# Patient Record
Sex: FEMALE | Race: WHITE | NOT HISPANIC OR LATINO | Employment: UNEMPLOYED | URBAN - METROPOLITAN AREA
[De-identification: names, ages, dates, MRNs, and addresses within clinical notes are randomized per-mention and may not be internally consistent; named-entity substitution may affect disease eponyms.]

---

## 2021-04-29 ENCOUNTER — OFFICE VISIT (OUTPATIENT)
Dept: FAMILY MEDICINE CLINIC | Facility: CLINIC | Age: 41
End: 2021-04-29

## 2021-04-29 VITALS
SYSTOLIC BLOOD PRESSURE: 144 MMHG | HEART RATE: 100 BPM | WEIGHT: 272 LBS | RESPIRATION RATE: 18 BRPM | BODY MASS INDEX: 43.71 KG/M2 | HEIGHT: 66 IN | TEMPERATURE: 97.1 F | OXYGEN SATURATION: 98 % | DIASTOLIC BLOOD PRESSURE: 82 MMHG

## 2021-04-29 DIAGNOSIS — R22.1 NECK MASS: ICD-10-CM

## 2021-04-29 DIAGNOSIS — E78.5 DYSLIPIDEMIA: ICD-10-CM

## 2021-04-29 DIAGNOSIS — J82.83 EOSINOPHILIC ASTHMA: Primary | ICD-10-CM

## 2021-04-29 DIAGNOSIS — Z12.31 ENCOUNTER FOR SCREENING MAMMOGRAM FOR MALIGNANT NEOPLASM OF BREAST: ICD-10-CM

## 2021-04-29 DIAGNOSIS — E07.9 THYROID DISORDER: ICD-10-CM

## 2021-04-29 DIAGNOSIS — E55.9 VITAMIN D DEFICIENCY: ICD-10-CM

## 2021-04-29 DIAGNOSIS — F17.200 SMOKER: ICD-10-CM

## 2021-04-29 DIAGNOSIS — Z01.419 ROUTINE GYNECOLOGICAL EXAMINATION: ICD-10-CM

## 2021-04-29 DIAGNOSIS — Z00.00 ANNUAL PHYSICAL EXAM: ICD-10-CM

## 2021-04-29 DIAGNOSIS — Z11.59 NEED FOR HEPATITIS C SCREENING TEST: ICD-10-CM

## 2021-04-29 LAB
BASOPHILS # BLD AUTO: 0.07 10*3/MM3 (ref 0–0.2)
BASOPHILS NFR BLD AUTO: 0.7 % (ref 0–1.5)
DEPRECATED RDW RBC AUTO: 40.3 FL (ref 37–54)
EOSINOPHIL # BLD AUTO: 0.54 10*3/MM3 (ref 0–0.4)
EOSINOPHIL NFR BLD AUTO: 5.6 % (ref 0.3–6.2)
ERYTHROCYTE [DISTWIDTH] IN BLOOD BY AUTOMATED COUNT: 12.8 % (ref 12.3–15.4)
HCT VFR BLD AUTO: 44.7 % (ref 34–46.6)
HGB BLD-MCNC: 14.5 G/DL (ref 12–15.9)
IMM GRANULOCYTES # BLD AUTO: 0.1 10*3/MM3 (ref 0–0.05)
IMM GRANULOCYTES NFR BLD AUTO: 1 % (ref 0–0.5)
LYMPHOCYTES # BLD AUTO: 1.99 10*3/MM3 (ref 0.7–3.1)
LYMPHOCYTES NFR BLD AUTO: 20.6 % (ref 19.6–45.3)
MCH RBC QN AUTO: 28.4 PG (ref 26.6–33)
MCHC RBC AUTO-ENTMCNC: 32.4 G/DL (ref 31.5–35.7)
MCV RBC AUTO: 87.6 FL (ref 79–97)
MONOCYTES # BLD AUTO: 0.74 10*3/MM3 (ref 0.1–0.9)
MONOCYTES NFR BLD AUTO: 7.7 % (ref 5–12)
NEUTROPHILS NFR BLD AUTO: 6.2 10*3/MM3 (ref 1.7–7)
NEUTROPHILS NFR BLD AUTO: 64.4 % (ref 42.7–76)
NRBC BLD AUTO-RTO: 0 /100 WBC (ref 0–0.2)
PLATELET # BLD AUTO: 301 10*3/MM3 (ref 140–450)
PMV BLD AUTO: 11.1 FL (ref 6–12)
RBC # BLD AUTO: 5.1 10*6/MM3 (ref 3.77–5.28)
WBC # BLD AUTO: 9.64 10*3/MM3 (ref 3.4–10.8)

## 2021-04-29 PROCEDURE — 99204 OFFICE O/P NEW MOD 45 MIN: CPT | Performed by: FAMILY MEDICINE

## 2021-04-29 PROCEDURE — 84443 ASSAY THYROID STIM HORMONE: CPT | Performed by: FAMILY MEDICINE

## 2021-04-29 PROCEDURE — 86803 HEPATITIS C AB TEST: CPT | Performed by: FAMILY MEDICINE

## 2021-04-29 PROCEDURE — 82306 VITAMIN D 25 HYDROXY: CPT | Performed by: FAMILY MEDICINE

## 2021-04-29 PROCEDURE — 84439 ASSAY OF FREE THYROXINE: CPT | Performed by: FAMILY MEDICINE

## 2021-04-29 PROCEDURE — 85025 COMPLETE CBC W/AUTO DIFF WBC: CPT | Performed by: FAMILY MEDICINE

## 2021-04-29 PROCEDURE — 80053 COMPREHEN METABOLIC PANEL: CPT | Performed by: FAMILY MEDICINE

## 2021-04-29 RX ORDER — ALBUTEROL SULFATE 90 UG/1
2 AEROSOL, METERED RESPIRATORY (INHALATION) EVERY 6 HOURS PRN
Qty: 18 G | Refills: 5 | Status: SHIPPED | OUTPATIENT
Start: 2021-04-29 | End: 2021-06-11 | Stop reason: SDUPTHER

## 2021-04-29 RX ORDER — ALBUTEROL SULFATE 90 UG/1
2 AEROSOL, METERED RESPIRATORY (INHALATION) EVERY 6 HOURS PRN
COMMUNITY
Start: 2021-04-11 | End: 2021-04-29 | Stop reason: SDUPTHER

## 2021-04-29 RX ORDER — VARENICLINE TARTRATE 0.5 MG/1
0.5 TABLET, FILM COATED ORAL 2 TIMES DAILY
Qty: 60 TABLET | Refills: 2 | Status: SHIPPED | OUTPATIENT
Start: 2021-04-29 | End: 2022-05-18

## 2021-04-30 LAB
25(OH)D3 SERPL-MCNC: 21.7 NG/ML (ref 30–100)
ALBUMIN SERPL-MCNC: 4.7 G/DL (ref 3.5–5.2)
ALBUMIN/GLOB SERPL: 1.9 G/DL
ALP SERPL-CCNC: 51 U/L (ref 39–117)
ALT SERPL W P-5'-P-CCNC: 16 U/L (ref 1–33)
ANION GAP SERPL CALCULATED.3IONS-SCNC: 12.2 MMOL/L (ref 5–15)
AST SERPL-CCNC: 12 U/L (ref 1–32)
BILIRUB SERPL-MCNC: 0.3 MG/DL (ref 0–1.2)
BUN SERPL-MCNC: 7 MG/DL (ref 6–20)
BUN/CREAT SERPL: 8.6 (ref 7–25)
CALCIUM SPEC-SCNC: 9.2 MG/DL (ref 8.6–10.5)
CHLORIDE SERPL-SCNC: 103 MMOL/L (ref 98–107)
CO2 SERPL-SCNC: 26.8 MMOL/L (ref 22–29)
CREAT SERPL-MCNC: 0.81 MG/DL (ref 0.57–1)
GFR SERPL CREATININE-BSD FRML MDRD: 78 ML/MIN/1.73
GLOBULIN UR ELPH-MCNC: 2.5 GM/DL
GLUCOSE SERPL-MCNC: 87 MG/DL (ref 65–99)
HCV AB SER DONR QL: NORMAL
POTASSIUM SERPL-SCNC: 4 MMOL/L (ref 3.5–5.2)
PROT SERPL-MCNC: 7.2 G/DL (ref 6–8.5)
SODIUM SERPL-SCNC: 142 MMOL/L (ref 136–145)
T4 FREE SERPL-MCNC: 1.09 NG/DL (ref 0.93–1.7)
TSH SERPL DL<=0.05 MIU/L-ACNC: 2.87 UIU/ML (ref 0.27–4.2)

## 2021-05-02 RX ORDER — ERGOCALCIFEROL 1.25 MG/1
50000 CAPSULE ORAL WEEKLY
Qty: 12 CAPSULE | Refills: 0 | Status: SHIPPED | OUTPATIENT
Start: 2021-05-02 | End: 2021-08-26

## 2021-05-12 ENCOUNTER — HOSPITAL ENCOUNTER (OUTPATIENT)
Dept: ULTRASOUND IMAGING | Facility: HOSPITAL | Age: 41
End: 2021-05-12

## 2021-06-09 ENCOUNTER — TELEPHONE (OUTPATIENT)
Dept: FAMILY MEDICINE CLINIC | Facility: CLINIC | Age: 41
End: 2021-06-09

## 2021-06-09 NOTE — TELEPHONE ENCOUNTER
Pt needs to take a different inhaler daily for maintenance and albuterol  only as needed  as needed  Did she go to allergist as ordered?  She can have a sample of trelegy to take 1x a day and make fu appy

## 2021-06-09 NOTE — TELEPHONE ENCOUNTER
Caller: Kenia Noyola    Relationship to patient: Self    Best call back number: [phone]    Patient is needing: PATIENT IS REQUESTING THE FREQUENCY OF albuterol sulfate  (90 Base) MCG/ACT inhaler BE ADJUSTED TO EVERY TWO WEEKS FOR REFILLS. PATIENT'S ASTHMA HAS BEEN SO BAD, SHE GOES THROUGH AN INHALER ONCE A WEEK. PATIENT'S INSURANCE WILL ONLY COVER ONCE A MONTH UNLESS FREQUENCY IS ADJUSTED AND AUTHORIZED. PLEASE CALL PATIENT TO DISCUSS OPTIONS.

## 2021-06-10 RX ORDER — MONTELUKAST SODIUM 10 MG/1
10 TABLET ORAL NIGHTLY
COMMUNITY
End: 2022-05-18

## 2021-06-10 NOTE — TELEPHONE ENCOUNTER
Pt is using Trelegy daily and the Albuterol PRN, but she is still needing the Albuterol frequently and needs the refill. Also taking Singulair daily. She has an appt w Allergist on 6/14.

## 2021-06-11 RX ORDER — ALBUTEROL SULFATE 90 UG/1
2 AEROSOL, METERED RESPIRATORY (INHALATION) EVERY 4 HOURS PRN
Qty: 18 G | Refills: 3 | Status: SHIPPED | OUTPATIENT
Start: 2021-06-11 | End: 2021-08-05

## 2021-06-11 RX ORDER — ALBUTEROL SULFATE 90 UG/1
2 AEROSOL, METERED RESPIRATORY (INHALATION) EVERY 6 HOURS PRN
Qty: 18 G | Refills: 3 | Status: SHIPPED | OUTPATIENT
Start: 2021-06-11 | End: 2021-06-11 | Stop reason: SDUPTHER

## 2021-06-16 ENCOUNTER — APPOINTMENT (OUTPATIENT)
Dept: MAMMOGRAPHY | Facility: HOSPITAL | Age: 41
End: 2021-06-16

## 2021-08-05 RX ORDER — ALBUTEROL SULFATE 90 UG/1
2 AEROSOL, METERED RESPIRATORY (INHALATION) EVERY 4 HOURS PRN
Qty: 18 G | Refills: 2 | Status: SHIPPED | OUTPATIENT
Start: 2021-08-05 | End: 2022-06-20 | Stop reason: SDUPTHER

## 2021-08-05 NOTE — TELEPHONE ENCOUNTER
Last visit: 4/29/21  Next visit:none  Last labs:4/29/21    Rx requested: Albuterol   Pharmacy: [facility] in [place]

## 2021-08-26 ENCOUNTER — TRANSCRIBE ORDERS (OUTPATIENT)
Dept: ADMINISTRATIVE | Facility: HOSPITAL | Age: 41
End: 2021-08-26

## 2021-08-26 ENCOUNTER — LAB (OUTPATIENT)
Dept: LAB | Facility: HOSPITAL | Age: 41
End: 2021-08-26

## 2021-08-26 DIAGNOSIS — J45.909 ALLERGIC ASTHMA WITH STATED CAUSE: ICD-10-CM

## 2021-08-26 DIAGNOSIS — J45.909 ALLERGIC ASTHMA WITH STATED CAUSE: Primary | ICD-10-CM

## 2021-08-26 LAB
ALPHA1 GLOB MFR UR ELPH: 122 MG/DL (ref 90–200)
BASOPHILS # BLD AUTO: 0.04 10*3/MM3 (ref 0–0.2)
BASOPHILS NFR BLD AUTO: 0.4 % (ref 0–1.5)
DEPRECATED RDW RBC AUTO: 40.1 FL (ref 37–54)
EOSINOPHIL # BLD AUTO: 0.47 10*3/MM3 (ref 0–0.4)
EOSINOPHIL NFR BLD AUTO: 4.8 % (ref 0.3–6.2)
ERYTHROCYTE [DISTWIDTH] IN BLOOD BY AUTOMATED COUNT: 13.1 % (ref 12.3–15.4)
HCT VFR BLD AUTO: 41.5 % (ref 34–46.6)
HGB BLD-MCNC: 14.1 G/DL (ref 12–15.9)
IGG1 SER-MCNC: 784 MG/DL (ref 700–1600)
IMM GRANULOCYTES # BLD AUTO: 0.11 10*3/MM3 (ref 0–0.05)
IMM GRANULOCYTES NFR BLD AUTO: 1.1 % (ref 0–0.5)
LYMPHOCYTES # BLD AUTO: 1.84 10*3/MM3 (ref 0.7–3.1)
LYMPHOCYTES NFR BLD AUTO: 18.9 % (ref 19.6–45.3)
MCH RBC QN AUTO: 29.2 PG (ref 26.6–33)
MCHC RBC AUTO-ENTMCNC: 34 G/DL (ref 31.5–35.7)
MCV RBC AUTO: 85.9 FL (ref 79–97)
MONOCYTES # BLD AUTO: 0.65 10*3/MM3 (ref 0.1–0.9)
MONOCYTES NFR BLD AUTO: 6.7 % (ref 5–12)
NEUTROPHILS NFR BLD AUTO: 6.64 10*3/MM3 (ref 1.7–7)
NEUTROPHILS NFR BLD AUTO: 68.1 % (ref 42.7–76)
NRBC BLD AUTO-RTO: 0 /100 WBC (ref 0–0.2)
PLATELET # BLD AUTO: 267 10*3/MM3 (ref 140–450)
PMV BLD AUTO: 10.8 FL (ref 6–12)
RBC # BLD AUTO: 4.83 10*6/MM3 (ref 3.77–5.28)
WBC # BLD AUTO: 9.75 10*3/MM3 (ref 3.4–10.8)

## 2021-08-26 PROCEDURE — 36415 COLL VENOUS BLD VENIPUNCTURE: CPT

## 2021-08-26 PROCEDURE — 82785 ASSAY OF IGE: CPT

## 2021-08-26 PROCEDURE — 82784 ASSAY IGA/IGD/IGG/IGM EACH: CPT

## 2021-08-26 PROCEDURE — 85025 COMPLETE CBC W/AUTO DIFF WBC: CPT

## 2021-08-26 PROCEDURE — 82103 ALPHA-1-ANTITRYPSIN TOTAL: CPT

## 2021-08-26 RX ORDER — ERGOCALCIFEROL 1.25 MG/1
50000 CAPSULE ORAL WEEKLY
Qty: 4 CAPSULE | Refills: 0 | Status: SHIPPED | OUTPATIENT
Start: 2021-08-26 | End: 2022-05-18 | Stop reason: SDUPTHER

## 2021-08-26 NOTE — TELEPHONE ENCOUNTER
Rx Refill Note  Requested Prescriptions     Pending Prescriptions Disp Refills   • vitamin D (ERGOCALCIFEROL) 1.25 MG (29638 UT) capsule capsule [Pharmacy Med Name: VITAMIN D [id number]] 12 capsule 0     Sig: TAKE 1 CAPSULE BY MOUTH 1 (ONE) TIME PER WEEK.      Last office visit with prescribing clinician: 4/29/2021      Next office visit with prescribing clinician: Visit date not found     Vitamin D 25 hydroxy (04/29/2021 15:27)         Natividad Roberts, RT  08/26/21, 13:28 EDT

## 2021-08-29 LAB — IGE SERPL-ACNC: 148 IU/ML (ref 6–495)

## 2022-05-18 ENCOUNTER — OFFICE VISIT (OUTPATIENT)
Dept: FAMILY MEDICINE CLINIC | Facility: CLINIC | Age: 42
End: 2022-05-18

## 2022-05-18 VITALS
OXYGEN SATURATION: 97 % | WEIGHT: 257 LBS | HEART RATE: 94 BPM | BODY MASS INDEX: 41.3 KG/M2 | DIASTOLIC BLOOD PRESSURE: 98 MMHG | SYSTOLIC BLOOD PRESSURE: 142 MMHG | HEIGHT: 66 IN

## 2022-05-18 DIAGNOSIS — R63.5 WEIGHT GAIN: ICD-10-CM

## 2022-05-18 DIAGNOSIS — R53.82 CHRONIC FATIGUE: ICD-10-CM

## 2022-05-18 DIAGNOSIS — Z00.00 PREVENTATIVE HEALTH CARE: ICD-10-CM

## 2022-05-18 DIAGNOSIS — J45.50 SEVERE PERSISTENT ASTHMA, UNSPECIFIED WHETHER COMPLICATED: Primary | ICD-10-CM

## 2022-05-18 DIAGNOSIS — E55.9 VITAMIN D DEFICIENCY: ICD-10-CM

## 2022-05-18 DIAGNOSIS — F32.0 CURRENT MILD EPISODE OF MAJOR DEPRESSIVE DISORDER WITHOUT PRIOR EPISODE: ICD-10-CM

## 2022-05-18 DIAGNOSIS — L65.9 HAIR THINNING: ICD-10-CM

## 2022-05-18 DIAGNOSIS — R22.1 NECK MASS: ICD-10-CM

## 2022-05-18 DIAGNOSIS — I10 PRIMARY HYPERTENSION: ICD-10-CM

## 2022-05-18 LAB — HBA1C MFR BLD: 5.2 % (ref 3.5–5.6)

## 2022-05-18 PROCEDURE — 83036 HEMOGLOBIN GLYCOSYLATED A1C: CPT | Performed by: NURSE PRACTITIONER

## 2022-05-18 PROCEDURE — 82607 VITAMIN B-12: CPT | Performed by: NURSE PRACTITIONER

## 2022-05-18 PROCEDURE — 84403 ASSAY OF TOTAL TESTOSTERONE: CPT | Performed by: NURSE PRACTITIONER

## 2022-05-18 PROCEDURE — 86376 MICROSOMAL ANTIBODY EACH: CPT | Performed by: NURSE PRACTITIONER

## 2022-05-18 PROCEDURE — 80061 LIPID PANEL: CPT | Performed by: NURSE PRACTITIONER

## 2022-05-18 PROCEDURE — 99214 OFFICE O/P EST MOD 30 MIN: CPT | Performed by: NURSE PRACTITIONER

## 2022-05-18 PROCEDURE — 82306 VITAMIN D 25 HYDROXY: CPT | Performed by: NURSE PRACTITIONER

## 2022-05-18 PROCEDURE — 80050 GENERAL HEALTH PANEL: CPT | Performed by: NURSE PRACTITIONER

## 2022-05-18 PROCEDURE — 82672 ASSAY OF ESTROGEN: CPT | Performed by: NURSE PRACTITIONER

## 2022-05-18 PROCEDURE — 84146 ASSAY OF PROLACTIN: CPT | Performed by: NURSE PRACTITIONER

## 2022-05-18 PROCEDURE — 82533 TOTAL CORTISOL: CPT | Performed by: NURSE PRACTITIONER

## 2022-05-18 RX ORDER — LISINOPRIL 20 MG/1
20 TABLET ORAL DAILY
Qty: 30 TABLET | Refills: 1 | Status: SHIPPED | OUTPATIENT
Start: 2022-05-18 | End: 2022-08-22

## 2022-05-18 RX ORDER — BENRALIZUMAB 30 MG/ML
INJECTION, SOLUTION SUBCUTANEOUS
COMMUNITY
Start: 2022-05-16 | End: 2023-01-17

## 2022-05-18 RX ORDER — BUDESONIDE AND FORMOTEROL FUMARATE DIHYDRATE 160; 4.5 UG/1; UG/1
AEROSOL RESPIRATORY (INHALATION)
COMMUNITY
Start: 2022-04-27 | End: 2023-01-17 | Stop reason: SDUPTHER

## 2022-05-18 RX ORDER — ERGOCALCIFEROL 1.25 MG/1
50000 CAPSULE ORAL WEEKLY
Qty: 4 CAPSULE | Refills: 5 | Status: SHIPPED | OUTPATIENT
Start: 2022-05-18 | End: 2022-05-20

## 2022-05-18 NOTE — PROGRESS NOTES
Chief Complaint  Establish Care (Wanting a complete check up has not seen a provider in a while. Wanting additional labs done ), Fatigue, Alopecia, Depression, and Weight Gain    Subjective          Kenia Noyola presents to [facility]  History of Present Illness    Patient presents to establish care with provider. She has Asthma, taking Symbicort 2 puffs BID, Trelegy 1 puff daily and Fasenra injection every 8 weeks. She is followed by ENT. Patient is c/o chronic fatigue, weight gain, and hair loss. She reports hair is coming out in chunks.  Patient has tried KETO, intermittent fasting and eats healthy. She walks three miles daily. Patient also c/o depression, cannot sleep at night. Patient c/o depression, feeling foggy, and tired all of the time. These symptoms create depression for patient. Eating causes patient to be drowsy. Patient weighed #140 ten years ago and has consistently gained weight. Patient denies dizziness, headache, edema, or syncope. She does have c/o left sided chest pain that is occurring once weekly that occurs at rest for the last couple of months. There is a family history of CAD and patient smokes 1/2 PPD. Blood pressure is elevated today, patient reports frequently elevated at home.  Pap Smear was approximately four years ago. Cycles are regular, monthly.  Patient also concern with mass underneath her chin.  She reports it has been there for years but is growing in size.    PHQ-9 Depression Screening  Little interest or pleasure in doing things? 0-->not at all   Feeling down, depressed, or hopeless? 2-->more than half the days   Trouble falling or staying asleep, or sleeping too much? 3-->nearly every day   Feeling tired or having little energy? 3-->nearly every day   Poor appetite or overeating? 0-->not at all   Feeling bad about yourself - or that you are a failure or have let yourself or your family down? 0-->not at all   Trouble concentrating on things,  "such as reading the newspaper or watching television? 3-->nearly every day   Moving or speaking so slowly that other people could have noticed? Or the opposite - being so fidgety or restless that you have been moving around a lot more than usual? 0-->not at all   Thoughts that you would be better off dead, or of hurting yourself in some way? 0-->not at all   PHQ-9 Total Score 11   If you checked off any problems, how difficult have these problems made it for you to do your work, take care of things at home, or get along with other people? somewhat difficult       Objective   Vital Signs:  /98 (BP Location: Left arm, Patient Position: Sitting, Cuff Size: Large Adult)   Pulse 94   Ht 167.6 cm (66\")   Wt 117 kg (257 lb)   SpO2 97%   BMI 41.48 kg/m²   Class 3 Severe Obesity (BMI >=40). Obesity-related health conditions include the following: hypertension. Obesity is worsening. BMI is is above average; BMI management plan is completed. We discussed portion control and increasing exercise.      Physical Exam  Constitutional:       Appearance: Normal appearance.   HENT:      Head: Normocephalic.   Neck:     Cardiovascular:      Rate and Rhythm: Normal rate and regular rhythm.   Pulmonary:      Effort: Pulmonary effort is normal.      Breath sounds: Examination of the right-lower field reveals wheezing. Wheezing present.   Abdominal:      General: Abdomen is flat. Bowel sounds are normal.      Palpations: Abdomen is soft.   Musculoskeletal:         General: Normal range of motion.      Cervical back: Neck supple.      Right lower leg: No edema.      Left lower leg: No edema.   Skin:     General: Skin is warm and dry.   Neurological:      Mental Status: She is alert and oriented to person, place, and time.      Gait: Gait is intact.   Psychiatric:         Attention and Perception: Attention normal.         Mood and Affect: Mood normal.         Speech: Speech normal.        Result Review :         CBC    CBC " 8/26/21   WBC 9.75   RBC 4.83   Hemoglobin 14.1   Hematocrit 41.5   MCV 85.9   MCH 29.2   MCHC 34.0   RDW 13.1   Platelets 267                                 Assessment and Plan    Diagnoses and all orders for this visit:    1. Severe persistent asthma, unspecified whether complicated (Primary)  Assessment & Plan:  1.  Continue Symbicort and Spiriva as prescribed  2.  Fasenra injections per ENT  3.  Follow-up as instructed          2. Vitamin D deficiency  Assessment & Plan:  1.  Repeat vitamin D level    Orders:  -     Vitamin D 25 Hydroxy    3. Neck mass  Assessment & Plan:  1.  Ultrasound area of concern    Orders:  -     US Head Neck Soft Tissue; Future    4. Preventative health care  Assessment & Plan:  1.  Pap smear is due    Orders:  -     CBC & Differential  -     Comprehensive Metabolic Panel  -     Hemoglobin A1c  -     Lipid Panel  -     TSH  -     Vitamin B12    5. Chronic fatigue  Assessment & Plan:  1.  Check labs  2.  Consider sleep study    Orders:  -     Hemoglobin A1c  -     TSH  -     Vitamin B12  -     Testosterone  -     Cortisol  -     Prolactin  -     Estrogens, Total  -     Thyroid Peroxidase Antibody    6. Hair thinning  Assessment & Plan:  1.  Check labs    Orders:  -     Testosterone  -     Cortisol  -     Prolactin  -     Estrogens, Total  -     Thyroid Peroxidase Antibody    7. Current mild episode of major depressive disorder without prior episode (HCC)  Assessment & Plan:  1.  Patient wants to work-up because of fatigue and weight gain.  If labs otherwise normal, she would like to discuss treatments for depression      8. Weight gain  Assessment & Plan:  1.  Check labs    Orders:  -     Hemoglobin A1c  -     Testosterone  -     Cortisol  -     Prolactin  -     Estrogens, Total  -     Thyroid Peroxidase Antibody    9. Primary hypertension  Assessment & Plan:  1.  Start lisinopril 20 mg daily  2.  Follow-up in 4 weeks      Other orders  -     lisinopril (PRINIVIL,ZESTRIL) 20 MG tablet;  Take 1 tablet by mouth Daily.  Dispense: 30 tablet; Refill: 1  -     vitamin D (ERGOCALCIFEROL) 1.25 MG (11958 UT) capsule capsule; Take 1 capsule by mouth 1 (One) Time Per Week.  Dispense: 4 capsule; Refill: 5         I spent 30 minutes caring for Kenia on this date of service. This time includes time spent by me in the following activities:preparing for the visit, reviewing tests, obtaining and/or reviewing a separately obtained history, performing a medically appropriate examination and/or evaluation , counseling and educating the patient/family/caregiver, ordering medications, tests, or procedures, documenting information in the medical record and care coordination  Follow Up {Instructions Charge Capture  Follow-up Communications :23}  Return in about 4 weeks (around 6/15/2022).  Patient was given instructions and counseling regarding her condition or for health maintenance advice. Please see specific information pulled into the AVS if appropriate.

## 2022-05-18 NOTE — ASSESSMENT & PLAN NOTE
1.  Continue Symbicort and Spiriva as prescribed  2.  Fasenra injections per ENT  3.  Follow-up as instructed

## 2022-05-18 NOTE — ASSESSMENT & PLAN NOTE
1.  Patient wants to work-up because of fatigue and weight gain.  If labs otherwise normal, she would like to discuss treatments for depression

## 2022-05-19 LAB
25(OH)D3 SERPL-MCNC: 26.9 NG/ML (ref 30–100)
ALBUMIN SERPL-MCNC: 4.9 G/DL (ref 3.5–5.2)
ALBUMIN/GLOB SERPL: 2 G/DL
ALP SERPL-CCNC: 57 U/L (ref 39–117)
ALT SERPL W P-5'-P-CCNC: 26 U/L (ref 1–33)
ANION GAP SERPL CALCULATED.3IONS-SCNC: 14.6 MMOL/L (ref 5–15)
AST SERPL-CCNC: 27 U/L (ref 1–32)
BASOPHILS # BLD AUTO: 0.01 10*3/MM3 (ref 0–0.2)
BASOPHILS NFR BLD AUTO: 0.2 % (ref 0–1.5)
BILIRUB SERPL-MCNC: 0.3 MG/DL (ref 0–1.2)
BUN SERPL-MCNC: 12 MG/DL (ref 6–20)
BUN/CREAT SERPL: 14 (ref 7–25)
CALCIUM SPEC-SCNC: 9.5 MG/DL (ref 8.6–10.5)
CHLORIDE SERPL-SCNC: 100 MMOL/L (ref 98–107)
CHOLEST SERPL-MCNC: 230 MG/DL (ref 0–200)
CO2 SERPL-SCNC: 23.4 MMOL/L (ref 22–29)
CORTIS SERPL-MCNC: 5.25 MCG/DL
CREAT SERPL-MCNC: 0.86 MG/DL (ref 0.57–1)
DEPRECATED RDW RBC AUTO: 43.5 FL (ref 37–54)
EGFRCR SERPLBLD CKD-EPI 2021: 87.2 ML/MIN/1.73
EOSINOPHIL # BLD AUTO: 0 10*3/MM3 (ref 0–0.4)
EOSINOPHIL NFR BLD AUTO: 0 % (ref 0.3–6.2)
ERYTHROCYTE [DISTWIDTH] IN BLOOD BY AUTOMATED COUNT: 13.6 % (ref 12.3–15.4)
GLOBULIN UR ELPH-MCNC: 2.5 GM/DL
GLUCOSE SERPL-MCNC: 90 MG/DL (ref 65–99)
HCT VFR BLD AUTO: 45.4 % (ref 34–46.6)
HDLC SERPL-MCNC: 41 MG/DL (ref 40–60)
HGB BLD-MCNC: 14.8 G/DL (ref 12–15.9)
IMM GRANULOCYTES # BLD AUTO: 0.04 10*3/MM3 (ref 0–0.05)
IMM GRANULOCYTES NFR BLD AUTO: 0.8 % (ref 0–0.5)
LDLC SERPL CALC-MCNC: 159 MG/DL (ref 0–100)
LDLC/HDLC SERPL: 3.81 {RATIO}
LYMPHOCYTES # BLD AUTO: 1.28 10*3/MM3 (ref 0.7–3.1)
LYMPHOCYTES NFR BLD AUTO: 27 % (ref 19.6–45.3)
MCH RBC QN AUTO: 28.7 PG (ref 26.6–33)
MCHC RBC AUTO-ENTMCNC: 32.6 G/DL (ref 31.5–35.7)
MCV RBC AUTO: 88 FL (ref 79–97)
MONOCYTES # BLD AUTO: 0.62 10*3/MM3 (ref 0.1–0.9)
MONOCYTES NFR BLD AUTO: 13.1 % (ref 5–12)
NEUTROPHILS NFR BLD AUTO: 2.79 10*3/MM3 (ref 1.7–7)
NEUTROPHILS NFR BLD AUTO: 58.9 % (ref 42.7–76)
NRBC BLD AUTO-RTO: 0 /100 WBC (ref 0–0.2)
PLATELET # BLD AUTO: 245 10*3/MM3 (ref 140–450)
PMV BLD AUTO: 11.4 FL (ref 6–12)
POTASSIUM SERPL-SCNC: 4.1 MMOL/L (ref 3.5–5.2)
PROLACTIN SERPL-MCNC: 8.46 NG/ML (ref 4.79–23.3)
PROT SERPL-MCNC: 7.4 G/DL (ref 6–8.5)
RBC # BLD AUTO: 5.16 10*6/MM3 (ref 3.77–5.28)
SODIUM SERPL-SCNC: 138 MMOL/L (ref 136–145)
TESTOST SERPL-MCNC: 14.1 NG/DL (ref 8.4–48.1)
TRIGL SERPL-MCNC: 164 MG/DL (ref 0–150)
TSH SERPL DL<=0.05 MIU/L-ACNC: 1.72 UIU/ML (ref 0.27–4.2)
VIT B12 BLD-MCNC: 399 PG/ML (ref 211–946)
VLDLC SERPL-MCNC: 30 MG/DL (ref 5–40)
WBC NRBC COR # BLD: 4.74 10*3/MM3 (ref 3.4–10.8)

## 2022-05-19 NOTE — PROGRESS NOTES
Glucose, kidney and liver function are normal.  Total cholesterol is elevated at 230 with slightly elevated triglycerides at 164.  Would recommend that patient decrease fried and fatty foods we will repeat fasting in 6 months and if no improvement in numbers, would recommend medication at that time.  Vitamin B12 is normal.  Vitamin D is low at 26.9.  I would recommend patient take 2000 units of vitamin D3 daily.  Thyroid level is normal.  Testosterone level is normal.  Her cortisol level is normal.  Prolactin is normal.  Slight elevation of monocyte count which can be attributed to allergies.  Otherwise, CBC looks good.  A1c is normal at 5.2.  I am awaiting 2 additional labs.    The 10-year ASCVD risk score (Yeison NORBERTO Jr., et al., 2013) is: 9%    Values used to calculate the score:      Age: 41 years      Sex: Female      Is Non- : No      Diabetic: No      Tobacco smoker: Yes      Systolic Blood Pressure: 142 mmHg      Is BP treated: Yes      HDL Cholesterol: 41 mg/dL      Total Cholesterol: 230 mg/dL

## 2022-05-20 LAB — THYROPEROXIDASE AB SERPL-ACNC: <8 IU/ML (ref 0–34)

## 2022-05-22 LAB — ESTROGEN SERPL-MCNC: 87 PG/ML

## 2022-05-24 ENCOUNTER — HOSPITAL ENCOUNTER (OUTPATIENT)
Dept: ULTRASOUND IMAGING | Facility: HOSPITAL | Age: 42
End: 2022-05-24

## 2022-05-31 ENCOUNTER — HOSPITAL ENCOUNTER (OUTPATIENT)
Dept: ULTRASOUND IMAGING | Facility: HOSPITAL | Age: 42
Discharge: HOME | End: 2022-05-31
Admitting: NURSE PRACTITIONER

## 2022-05-31 DIAGNOSIS — R22.1 NECK MASS: ICD-10-CM

## 2022-05-31 PROCEDURE — 76536 US EXAM OF HEAD AND NECK: CPT

## 2022-06-20 ENCOUNTER — OFFICE VISIT (OUTPATIENT)
Dept: FAMILY MEDICINE CLINIC | Facility: CLINIC | Age: 42
End: 2022-06-20

## 2022-06-20 VITALS
WEIGHT: 261 LBS | HEIGHT: 66 IN | DIASTOLIC BLOOD PRESSURE: 90 MMHG | HEART RATE: 93 BPM | SYSTOLIC BLOOD PRESSURE: 148 MMHG | OXYGEN SATURATION: 98 % | BODY MASS INDEX: 41.95 KG/M2

## 2022-06-20 DIAGNOSIS — L65.9 HAIR THINNING: ICD-10-CM

## 2022-06-20 DIAGNOSIS — R63.5 WEIGHT GAIN: ICD-10-CM

## 2022-06-20 DIAGNOSIS — R06.81 WITNESSED EPISODE OF APNEA: ICD-10-CM

## 2022-06-20 DIAGNOSIS — R53.82 CHRONIC FATIGUE: ICD-10-CM

## 2022-06-20 DIAGNOSIS — R22.1 NECK MASS: ICD-10-CM

## 2022-06-20 DIAGNOSIS — I10 PRIMARY HYPERTENSION: Primary | ICD-10-CM

## 2022-06-20 DIAGNOSIS — E55.9 VITAMIN D DEFICIENCY: ICD-10-CM

## 2022-06-20 DIAGNOSIS — Z71.6 ENCOUNTER FOR SMOKING CESSATION COUNSELING: ICD-10-CM

## 2022-06-20 DIAGNOSIS — F32.0 CURRENT MILD EPISODE OF MAJOR DEPRESSIVE DISORDER WITHOUT PRIOR EPISODE: ICD-10-CM

## 2022-06-20 PROCEDURE — 99214 OFFICE O/P EST MOD 30 MIN: CPT | Performed by: NURSE PRACTITIONER

## 2022-06-20 RX ORDER — BUPROPION HYDROCHLORIDE 100 MG/1
100 TABLET, EXTENDED RELEASE ORAL 2 TIMES DAILY
Qty: 60 TABLET | Refills: 1 | Status: SHIPPED | OUTPATIENT
Start: 2022-06-20 | End: 2022-08-22

## 2022-06-20 RX ORDER — ALBUTEROL SULFATE 90 UG/1
2 AEROSOL, METERED RESPIRATORY (INHALATION) EVERY 4 HOURS PRN
Qty: 18 G | Refills: 2 | Status: SHIPPED | OUTPATIENT
Start: 2022-06-20

## 2022-06-20 NOTE — PROGRESS NOTES
"Chief Complaint  Follow-up (4 week follow up HTN/did not start blood pressure medication yet)    Subjective        Kenia Noyola presents to [facility]  History of Present Illness    Patient presents for f/u visit.     Patient seen on 5/18 with c/o chronic fatigue, hair loss and weight gain. Vitamin D deficiency noted, otherwise labs were unremarkable. Patient reports the day starts out well but she becomes quickly exhausted. She does report that she snores and has had witnessed episodes of apnea.  Patient reports she is working on diet and exercise.  She does have some depression.    Patient prescribed Lisinopril 20mg daily at last visit, reports she wanted to try diet and exercise first.  Blood pressure minimally changed since last visit.  Patient denies dizziness, headache or vision changes.    Patient also previously complained about concerning mass to her neck.  Ultrasound showed 2 small lymph nodes with benign sonographic appearance.      Objective   Vital Signs:  /90 (BP Location: Left arm, Patient Position: Sitting, Cuff Size: Large Adult)   Pulse 93   Ht 167.6 cm (66\")   Wt 118 kg (261 lb)   SpO2 98%   BMI 42.13 kg/m²   Estimated body mass index is 42.13 kg/m² as calculated from the following:    Height as of this encounter: 167.6 cm (66\").    Weight as of this encounter: 118 kg (261 lb).    Class 3 Severe Obesity (BMI >=40). Obesity-related health conditions include the following: hypertension. Obesity is newly identified. BMI is is above average; BMI management plan is completed. We discussed low calorie, low carb based diet program, portion control, increasing exercise and management of depression/anxiety/stress to control compensatory eating.      Physical Exam  Constitutional:       Appearance: Normal appearance. She is obese.   HENT:      Head: Normocephalic.   Cardiovascular:      Rate and Rhythm: Normal rate and regular rhythm.   Pulmonary:      Effort: " Pulmonary effort is normal.      Breath sounds: Normal breath sounds.   Abdominal:      General: Abdomen is flat. Bowel sounds are normal.      Palpations: Abdomen is soft.   Musculoskeletal:         General: Normal range of motion.      Cervical back: Neck supple.      Right lower leg: No edema.      Left lower leg: No edema.   Skin:     General: Skin is warm and dry.   Neurological:      Mental Status: She is alert and oriented to person, place, and time.      Gait: Gait is intact.   Psychiatric:         Attention and Perception: Attention normal.         Mood and Affect: Mood normal.         Speech: Speech normal.        Result Review :    CMP    CMP 5/18/22   Glucose 90   BUN 12   Creatinine 0.86   Sodium 138   Potassium 4.1   Chloride 100   Calcium 9.5   Albumin 4.90   Total Bilirubin 0.3   Alkaline Phosphatase 57   AST (SGOT) 27   ALT (SGPT) 26           CBC    CBC 8/26/21 5/18/22   WBC 9.75 4.74   RBC 4.83 5.16   Hemoglobin 14.1 14.8   Hematocrit 41.5 45.4   MCV 85.9 88.0   MCH 29.2 28.7   MCHC 34.0 32.6   RDW 13.1 13.6   Platelets 267 245           Lipid Panel    Lipid Panel 5/18/22   Total Cholesterol 230 (A)   Triglycerides 164 (A)   HDL Cholesterol 41   VLDL Cholesterol 30   LDL Cholesterol  159 (A)   LDL/HDL Ratio 3.81   (A) Abnormal value            TSH    TSH 5/18/22   TSH 1.720           Most Recent A1C    HGBA1C Most Recent 5/18/22   Hemoglobin A1C 5.2                     Assessment and Plan   Diagnoses and all orders for this visit:    1. Primary hypertension (Primary)  Assessment & Plan:  1.  Recommended patient start lisinopril 20 mg daily      2. Witnessed episode of apnea  Assessment & Plan:  1.  Home sleep study ordered    Orders:  -     Home Sleep Study; Future    3. Neck mass  Assessment & Plan:  1.  Reviewed ultrasound with patient  2.  She is to call with increase in size or accompanying tenderness, fever or other concerns      4. Chronic fatigue    5. Hair thinning  Assessment & Plan:  1.   Consider referral to dermatologist, patient wants to wait      6. Vitamin D deficiency  Assessment & Plan:  1.  Continue daily supplementation      7. Weight gain  Assessment & Plan:  1.  Labs unremarkable  2.  Encouraged routine exercise  3.  Consider referral to bariatric team      8. Current mild episode of major depressive disorder without prior episode (HCC)  Assessment & Plan:  1.  Start Wellbutrin 100 mg twice daily  2.  Follow-up in 3 months      9. Encounter for smoking cessation counseling  Assessment & Plan:  1.  Start Wellbutrin 100 mg twice daily  2.  Call with any adverse side effects      Other orders  -     buPROPion SR (Wellbutrin SR) 100 MG 12 hr tablet; Take 1 tablet by mouth 2 (Two) Times a Day.  Dispense: 60 tablet; Refill: 1  -     albuterol sulfate  (90 Base) MCG/ACT inhaler; Inhale 2 puffs Every 4 (Four) Hours As Needed for Wheezing or Shortness of Air.  Dispense: 18 g; Refill: 2         I spent 30 minutes caring for Kenia on this date of service. This time includes time spent by me in the following activities:preparing for the visit, reviewing tests, obtaining and/or reviewing a separately obtained history, performing a medically appropriate examination and/or evaluation , counseling and educating the patient/family/caregiver, ordering medications, tests, or procedures, documenting information in the medical record, independently interpreting results and communicating that information with the patient/family/caregiver and care coordination  Follow Up   Return in about 3 months (around 9/20/2022) for Hypertension/Fatigue.  Patient was given instructions and counseling regarding her condition or for health maintenance advice. Please see specific information pulled into the AVS if appropriate.

## 2022-06-20 NOTE — ASSESSMENT & PLAN NOTE
1.  Reviewed ultrasound with patient  2.  She is to call with increase in size or accompanying tenderness, fever or other concerns

## 2022-07-25 DIAGNOSIS — G47.33 OBSTRUCTIVE SLEEP APNEA: Primary | ICD-10-CM

## 2022-08-22 RX ORDER — LISINOPRIL 20 MG/1
20 TABLET ORAL DAILY
Qty: 30 TABLET | Refills: 0 | Status: SHIPPED | OUTPATIENT
Start: 2022-08-22 | End: 2022-10-03

## 2022-08-22 RX ORDER — BUPROPION HYDROCHLORIDE 100 MG/1
TABLET, EXTENDED RELEASE ORAL
Qty: 60 TABLET | Refills: 0 | Status: SHIPPED | OUTPATIENT
Start: 2022-08-22 | End: 2023-01-17

## 2022-10-03 RX ORDER — LISINOPRIL 20 MG/1
20 TABLET ORAL DAILY
Qty: 30 TABLET | Refills: 0 | Status: SHIPPED | OUTPATIENT
Start: 2022-10-03 | End: 2022-11-22

## 2022-11-22 RX ORDER — LISINOPRIL 20 MG/1
20 TABLET ORAL DAILY
Qty: 30 TABLET | Refills: 0 | Status: SHIPPED | OUTPATIENT
Start: 2022-11-22 | End: 2023-01-24

## 2023-01-16 ENCOUNTER — TELEPHONE (OUTPATIENT)
Dept: FAMILY MEDICINE CLINIC | Facility: CLINIC | Age: 43
End: 2023-01-16
Payer: MEDICAID

## 2023-01-16 NOTE — TELEPHONE ENCOUNTER
It appears this appt spot was snagged before I was able to get back to the note and patient. Please advise on scheduling

## 2023-01-16 NOTE — TELEPHONE ENCOUNTER
Pt called in wishing to be seen today, preferably telehealth if possible. She has had a chronic cough for over a month that seem to be worsening. She begins getting coughing fits so strong that she begins to see spots and fears she may pass out. She had tried Dayquil/Nyquil, cough drops and still can't kick it. She describes it as a dry cough and that the cough medicine only takes he edge off. She states she's tested covid positive on Sunday, 1/8/23. Please advise

## 2023-01-17 ENCOUNTER — TELEMEDICINE (OUTPATIENT)
Dept: FAMILY MEDICINE CLINIC | Facility: CLINIC | Age: 43
End: 2023-01-17
Payer: MEDICAID

## 2023-01-17 DIAGNOSIS — J01.90 ACUTE NON-RECURRENT SINUSITIS, UNSPECIFIED LOCATION: Primary | ICD-10-CM

## 2023-01-17 DIAGNOSIS — J45.51 SEVERE PERSISTENT ASTHMA WITH ACUTE EXACERBATION: ICD-10-CM

## 2023-01-17 PROCEDURE — 99213 OFFICE O/P EST LOW 20 MIN: CPT | Performed by: NURSE PRACTITIONER

## 2023-01-17 RX ORDER — BUDESONIDE AND FORMOTEROL FUMARATE DIHYDRATE 160; 4.5 UG/1; UG/1
2 AEROSOL RESPIRATORY (INHALATION)
Qty: 30 EACH | Refills: 1 | Status: SHIPPED | OUTPATIENT
Start: 2023-01-17 | End: 2023-02-16

## 2023-01-17 RX ORDER — MONTELUKAST SODIUM 10 MG/1
10 TABLET ORAL NIGHTLY
Qty: 30 TABLET | Refills: 0 | Status: SHIPPED | OUTPATIENT
Start: 2023-01-17

## 2023-01-17 RX ORDER — AMOXICILLIN 500 MG/1
500 CAPSULE ORAL 2 TIMES DAILY
Qty: 20 CAPSULE | Refills: 0 | Status: SHIPPED | OUTPATIENT
Start: 2023-01-17 | End: 2023-01-27

## 2023-01-17 RX ORDER — BENZONATATE 100 MG/1
100 CAPSULE ORAL 3 TIMES DAILY PRN
Qty: 30 CAPSULE | Refills: 0 | Status: SHIPPED | OUTPATIENT
Start: 2023-01-17

## 2023-01-17 NOTE — PROGRESS NOTES
"Chief Complaint  Cough    Subjective        Kenia Noyola presents to [facility]  History of Present Illness    Patient presents with cough, present for approximately 1 month. She describes as dry cough, goes into \"coughing fit\" which causes lightheadedness. Allergy medication seems to decrease frequency. Patient reports as a \"tickle that never goes away.\"  Patient has history of Asthma, prescribed Symbicort but does not use. She has tried and failed OTC cough suppressant and her Belinda pot.  Patient did test positive for COVID-19 on 1/8 but symptoms started prior to illness.  She also reports accompanying nasal congestion with sinus pain and pressure.    Objective   Vital Signs:  There were no vitals taken for this visit.  Estimated body mass index is 42.13 kg/m² as calculated from the following:    Height as of 6/20/22: 167.6 cm (66\").    Weight as of 6/20/22: 118 kg (261 lb).      Deferred due to virtual visit.           Physical Exam  Constitutional:       Appearance: Normal appearance.   Pulmonary:      Effort: Pulmonary effort is normal.   Neurological:      Mental Status: She is alert and oriented to person, place, and time.   Psychiatric:         Mood and Affect: Mood normal.        Result Review :    CMP    CMP 5/18/22   Glucose 90   BUN 12   Creatinine 0.86   eGFR 87.2   Sodium 138   Potassium 4.1   Chloride 100   Calcium 9.5   Total Protein 7.4   Albumin 4.90   Globulin 2.5   Total Bilirubin 0.3   Alkaline Phosphatase 57   AST (SGOT) 27   ALT (SGPT) 26   Albumin/Globulin Ratio 2.0   BUN/Creatinine Ratio 14.0   Anion Gap 14.6      Comments are available for some flowsheets but are not being displayed.           CBC    CBC 5/18/22   WBC 4.74   RBC 5.16   Hemoglobin 14.8   Hematocrit 45.4   MCV 88.0   MCH 28.7   MCHC 32.6   RDW 13.6   Platelets 245           Lipid Panel    Lipid Panel 5/18/22   Total Cholesterol 230 (A)   Triglycerides 164 (A)   HDL Cholesterol 41   VLDL " Cholesterol 30   LDL Cholesterol  159 (A)   LDL/HDL Ratio 3.81   (A) Abnormal value            TSH    TSH 5/18/22   TSH 1.720           Most Recent A1C    HGBA1C Most Recent 5/18/22   Hemoglobin A1C 5.2                        Assessment and Plan   Diagnoses and all orders for this visit:    1. Acute non-recurrent sinusitis, unspecified location (Primary)    2. Severe persistent asthma with acute exacerbation  Assessment & Plan:  1.  Restart Symbicort 2 puffs twice daily  2.  Patient has prednisone that was prescribed during time of COVID-19 but did not use, advised her to start medication and finish as instructed  3.  Start amoxicillin 500 mg twice daily x10 days  4.  Start Singulair 10 mg nightly  5.  Continue daily Claritin  6.  Tessalon Perles as needed  7.  If persists, order chest x-ray        Other orders  -     benzonatate (Tessalon Perles) 100 MG capsule; Take 1 capsule by mouth 3 (Three) Times a Day As Needed for Cough.  Dispense: 30 capsule; Refill: 0  -     amoxicillin (AMOXIL) 500 MG capsule; Take 1 capsule by mouth 2 (Two) Times a Day for 10 days.  Dispense: 20 capsule; Refill: 0  -     montelukast (Singulair) 10 MG tablet; Take 1 tablet by mouth Every Night.  Dispense: 30 tablet; Refill: 0  -     budesonide-formoterol (SYMBICORT) 160-4.5 MCG/ACT inhaler; Inhale 2 puffs 2 (Two) Times a Day for 30 days.  Dispense: 30 each; Refill: 1         I spent 20 minutes caring for Kenia on this date of service. This time includes time spent by me in the following activities:preparing for the visit, obtaining and/or reviewing a separately obtained history, counseling and educating the patient/family/caregiver, ordering medications, tests, or procedures, documenting information in the medical record and care coordination  Follow Up   Return if symptoms worsen or fail to improve.  Patient was given instructions and counseling regarding her condition or for health maintenance advice. Please see specific information  pulled into the AVS if appropriate.     Mode of Visit: Video  Location of patient: home  [place] [facility] [place]  You have chosen to receive care through a telehealth visit.  Does the patient consent to use a video/audio connection for your medical care today? Yes  The visit included audio and video interaction. No technical issues occurred during this visit.

## 2023-01-17 NOTE — ASSESSMENT & PLAN NOTE
1.  Restart Symbicort 2 puffs twice daily  2.  Patient has prednisone that was prescribed during time of COVID-19 but did not use, advised her to start medication and finish as instructed  3.  Start amoxicillin 500 mg twice daily x10 days  4.  Start Singulair 10 mg nightly  5.  Continue daily Claritin  6.  Tessalon Perles as needed  7.  If persists, order chest x-ray     Last Clinic Visit: 11/20/2018 [facility]

## 2023-01-24 RX ORDER — LISINOPRIL 20 MG/1
20 TABLET ORAL DAILY
Qty: 30 TABLET | Refills: 0 | Status: SHIPPED | OUTPATIENT
Start: 2023-01-24 | End: 2023-03-20

## 2023-02-07 RX ORDER — BUPROPION HYDROCHLORIDE 100 MG/1
TABLET, EXTENDED RELEASE ORAL
Qty: 60 TABLET | Refills: 0 | OUTPATIENT
Start: 2023-02-07

## 2023-02-14 RX ORDER — BUPROPION HYDROCHLORIDE 150 MG/1
150 TABLET ORAL DAILY
Qty: 30 TABLET | Refills: 2 | Status: SHIPPED | OUTPATIENT
Start: 2023-02-14

## 2023-03-20 RX ORDER — LISINOPRIL 20 MG/1
20 TABLET ORAL DAILY
Qty: 30 TABLET | Refills: 0 | Status: SHIPPED | OUTPATIENT
Start: 2023-03-20

## 2023-05-03 RX ORDER — LISINOPRIL 20 MG/1
20 TABLET ORAL DAILY
Qty: 30 TABLET | Refills: 2 | Status: SHIPPED | OUTPATIENT
Start: 2023-05-03

## 2023-06-27 ENCOUNTER — TELEPHONE (OUTPATIENT)
Dept: FAMILY MEDICINE CLINIC | Facility: CLINIC | Age: 43
End: 2023-06-27

## 2023-06-27 NOTE — TELEPHONE ENCOUNTER
Caller: Kenia Noyola    Relationship: Self    Best call back number: [phone]     What was the call regarding: PATIENT IS NEEDING A PRESCRIPTION FOR THE CPAP MACHINE AND THE SUPPLIES. PATIENT STATED THAT NEVER RECEIVED A NEW MASK, TUBING, OR ANY OTHER SUPPLIES FOR THE MACHINE SINCE SHE HAS HAD IT. PATIENT STATED HER MASK IS RIPPING AND AIR LEAKS OUT OF IT. PATIENT STATED SHE GOT HER MACHINE FROM [facility]. PATIENT IS ASKING FOR A CALL BACK TO LET HER KNOW THAT THE PRESCRIPTION HAS BEEN SENT IN. PLEASE ADVISE.

## 2023-06-27 NOTE — TELEPHONE ENCOUNTER
Patient's CPAP was ordered in July 2022.  Supplies should come as part of that order.  Has the patient reached out to [facility] regarding the supplies?  Or is [facility] telling her she needs a prescription?

## 2023-08-15 ENCOUNTER — OFFICE VISIT (OUTPATIENT)
Dept: FAMILY MEDICINE CLINIC | Facility: CLINIC | Age: 43
End: 2023-08-15
Payer: MEDICAID

## 2023-08-15 VITALS
BODY MASS INDEX: 43.55 KG/M2 | WEIGHT: 271 LBS | HEART RATE: 102 BPM | HEIGHT: 66 IN | SYSTOLIC BLOOD PRESSURE: 148 MMHG | OXYGEN SATURATION: 98 % | DIASTOLIC BLOOD PRESSURE: 80 MMHG

## 2023-08-15 DIAGNOSIS — F90.9 ATTENTION DEFICIT HYPERACTIVITY DISORDER (ADHD), UNSPECIFIED ADHD TYPE: ICD-10-CM

## 2023-08-15 DIAGNOSIS — I10 PRIMARY HYPERTENSION: Primary | ICD-10-CM

## 2023-08-15 DIAGNOSIS — J45.50 SEVERE PERSISTENT ASTHMA WITHOUT COMPLICATION: ICD-10-CM

## 2023-08-15 DIAGNOSIS — Z00.00 PREVENTATIVE HEALTH CARE: ICD-10-CM

## 2023-08-15 DIAGNOSIS — G47.33 OSA (OBSTRUCTIVE SLEEP APNEA): ICD-10-CM

## 2023-08-15 DIAGNOSIS — E66.01 CLASS 3 SEVERE OBESITY WITH SERIOUS COMORBIDITY AND BODY MASS INDEX (BMI) OF 40.0 TO 44.9 IN ADULT, UNSPECIFIED OBESITY TYPE: ICD-10-CM

## 2023-08-15 PROBLEM — E66.813 CLASS 3 SEVERE OBESITY WITH SERIOUS COMORBIDITY AND BODY MASS INDEX (BMI) OF 40.0 TO 44.9 IN ADULT: Status: ACTIVE | Noted: 2023-08-15

## 2023-08-15 PROCEDURE — 80050 GENERAL HEALTH PANEL: CPT | Performed by: NURSE PRACTITIONER

## 2023-08-15 PROCEDURE — 83036 HEMOGLOBIN GLYCOSYLATED A1C: CPT | Performed by: NURSE PRACTITIONER

## 2023-08-15 PROCEDURE — 80061 LIPID PANEL: CPT | Performed by: NURSE PRACTITIONER

## 2023-08-15 RX ORDER — LISINOPRIL 20 MG/1
20 TABLET ORAL DAILY
Qty: 90 TABLET | Refills: 2 | Status: SHIPPED | OUTPATIENT
Start: 2023-08-15

## 2023-08-15 RX ORDER — LISINOPRIL 20 MG/1
20 TABLET ORAL DAILY
Qty: 90 TABLET | Refills: 2 | Status: SHIPPED | OUTPATIENT
Start: 2023-08-15 | End: 2023-08-15 | Stop reason: SDUPTHER

## 2023-08-15 NOTE — PROGRESS NOTES
Chief Complaint  ADHD (Discuss medication for ADHD/diagnosed online and she did not get medications with them ) and Obesity (Discuss medication to help with weight loss )    Subjective        Kenia Noyola presents to [facility]  History of Present Illness    Patient presents with concerns of ADHD. She reports diagnosed by a Psychiatrist online. Medication was not started yet, wanting PCP to manage. Patient describes having trouble wrapping up projects, difficulty organizing, difficulty with remembering obligations, makes careless mistakes, has difficulty keeping attention and is easily distracted.     Patient has hypertension, prescribed Lisinopril 20 mg daily. She reports she has had her medication today, took it with a Red Bull and rushed to get to the appointment. Patient denies dizziness, headache, chest pain, edema.     Patient also concerned with her weight. She has tried walking in the past without results. Patient has tried changing to well balanced, healthy diet and a total loss of 5 pounds. She reports tried and fails different things but meets failure. Patient is very active at work, walking miles daily. She has gained 10 pounds in the last year, wanting to discuss weight loss options. Patient has KARAN, has CPAP - unable to wear due to lack of supplies. She has poor internet connection so they are working to download usage so supplies can be provided.     Patient has Asthma, using Symbicort 2 puffs BID. She has accompanying allergies, followed by ENT.     PHQ-9 Depression Screening  Little interest or pleasure in doing things? 0-->not at all   Feeling down, depressed, or hopeless? 1-->several days   Trouble falling or staying asleep, or sleeping too much? 3-->nearly every day   Feeling tired or having little energy? 3-->nearly every day   Poor appetite or overeating? 0-->not at all   Feeling bad about yourself - or that you are a failure or have let yourself or your family  "down? 0-->not at all   Trouble concentrating on things, such as reading the newspaper or watching television? 3-->nearly every day   Moving or speaking so slowly that other people could have noticed? Or the opposite - being so fidgety or restless that you have been moving around a lot more than usual? 0-->not at all   Thoughts that you would be better off dead, or of hurting yourself in some way? 0-->not at all   PHQ-9 Total Score 10   If you checked off any problems, how difficult have these problems made it for you to do your work, take care of things at home, or get along with other people? somewhat difficult      Objective   Vital Signs:  /80 (BP Location: Left arm, Patient Position: Sitting, Cuff Size: Large Adult)   Pulse 102   Ht 167.6 cm (66\")   Wt 123 kg (271 lb)   SpO2 98%   BMI 43.74 kg/mý   Estimated body mass index is 43.74 kg/mý as calculated from the following:    Height as of this encounter: 167.6 cm (66\").    Weight as of this encounter: 123 kg (271 lb).       Class 3 Severe Obesity (BMI >=40). Obesity-related health conditions include the following: hypertension. Obesity is worsening. BMI is is above average; BMI management plan is completed. We discussed portion control, increasing exercise, management of depression/anxiety/stress to control compensatory eating, and decreasing alcohol consumption.      Physical Exam  Constitutional:       Appearance: Normal appearance. She is obese.   HENT:      Head: Normocephalic.   Cardiovascular:      Rate and Rhythm: Normal rate and regular rhythm.   Pulmonary:      Effort: Pulmonary effort is normal.      Breath sounds: Normal breath sounds.   Abdominal:      General: Abdomen is flat. Bowel sounds are normal.      Palpations: Abdomen is soft.   Musculoskeletal:         General: Normal range of motion.      Cervical back: Neck supple.      Right lower leg: No edema.      Left lower leg: No edema.   Skin:     General: Skin is warm and dry. "   Neurological:      Mental Status: She is alert and oriented to person, place, and time.      Gait: Gait is intact.   Psychiatric:         Attention and Perception: Attention normal.         Mood and Affect: Mood normal.         Speech: Speech normal.      Result Review :                   Assessment and Plan   Diagnoses and all orders for this visit:    1. Primary hypertension (Primary)  Assessment & Plan:  Blood pressure not at goal today  Continue lisinopril 20 mg daily  Advised patient to decrease caffeine intake      2. Class 3 severe obesity with serious comorbidity and body mass index (BMI) of 40.0 to 44.9 in adult, unspecified obesity type  Assessment & Plan:  Patient's (Body mass index is 43.74 kg/mý.) indicates that they are obese (BMI >30) with health conditions that include obstructive sleep apnea and hypertension . Weight is worsening. BMI  is above average; BMI management plan is completed. We discussed portion control, increasing exercise, management of depression/anxiety/stress to control compensatory eating, decreasing alcohol consumption, and pharmacologic options including Wegovy . Will check labs today     Orders:  -     CBC (No Diff)  -     Comprehensive Metabolic Panel  -     Hemoglobin A1c  -     Lipid Panel  -     TSH    3. Attention deficit hyperactivity disorder (ADHD), unspecified ADHD type  Assessment & Plan:  Reviewed ADHD screening questionnaire  Start Vyvanse 20 mg daily  Follow-up in 6 to 8 weeks    Orders:  -     Discontinue: lisdexamfetamine (Vyvanse) 20 MG capsule; Take 1 capsule by mouth Every Morning Indications: Attention Deficit Hyperactivity Disorder  Dispense: 30 capsule; Refill: 0  -     lisdexamfetamine (Vyvanse) 20 MG capsule; Take 1 capsule by mouth Every Morning Indications: Attention Deficit Hyperactivity Disorder  Dispense: 30 capsule; Refill: 0    4. Preventative health care  -     CBC (No Diff)  -     Comprehensive Metabolic Panel  -     Hemoglobin A1c  -     Lipid  Panel  -     TSH    5. Severe persistent asthma without complication  Assessment & Plan:  Continue Symbicort as prescribed  Follow-up with ENT for chronic allergies          6. KARAN (obstructive sleep apnea)  Assessment & Plan:  Nightly use of CPAP recommended      Other orders  -     Discontinue: lisinopril (PRINIVIL,ZESTRIL) 20 MG tablet; Take 1 tablet by mouth Daily.  Dispense: 90 tablet; Refill: 2  -     lisinopril (PRINIVIL,ZESTRIL) 20 MG tablet; Take 1 tablet by mouth Daily.  Dispense: 90 tablet; Refill: 2           I spent 30 minutes caring for Kenia on this date of service. This time includes time spent by me in the following activities:preparing for the visit, obtaining and/or reviewing a separately obtained history, performing a medically appropriate examination and/or evaluation , counseling and educating the patient/family/caregiver, ordering medications, tests, or procedures, documenting information in the medical record, and care coordination  Follow Up   Return in about 6 weeks (around 9/26/2023) for Obesity/ADHD.  Patient was given instructions and counseling regarding her condition or for health maintenance advice. Please see specific information pulled into the AVS if appropriate.

## 2023-08-15 NOTE — ASSESSMENT & PLAN NOTE
Patient's (Body mass index is 43.74 kg/mý.) indicates that they are obese (BMI >30) with health conditions that include obstructive sleep apnea and hypertension . Weight is worsening. BMI  is above average; BMI management plan is completed. We discussed portion control, increasing exercise, management of depression/anxiety/stress to control compensatory eating, decreasing alcohol consumption, and pharmacologic options including Wegovy . Will check labs today

## 2023-08-15 NOTE — PROGRESS NOTES
Venipuncture Blood Specimen Collection  Venipuncture performed in the right arm by Kaylen Charles MA with good hemostasis. Patient tolerated the procedure well without complications.   08/15/23   Kaylen Charles MA

## 2023-08-15 NOTE — ASSESSMENT & PLAN NOTE
Blood pressure not at goal today  Continue lisinopril 20 mg daily  Advised patient to decrease caffeine intake

## 2023-08-16 LAB
ALBUMIN SERPL-MCNC: 4.6 G/DL (ref 3.5–5.2)
ALBUMIN/GLOB SERPL: 1.8 G/DL
ALP SERPL-CCNC: 49 U/L (ref 39–117)
ALT SERPL W P-5'-P-CCNC: 16 U/L (ref 1–33)
ANION GAP SERPL CALCULATED.3IONS-SCNC: 12.5 MMOL/L (ref 5–15)
AST SERPL-CCNC: 17 U/L (ref 1–32)
BILIRUB SERPL-MCNC: 0.4 MG/DL (ref 0–1.2)
BUN SERPL-MCNC: 10 MG/DL (ref 6–20)
BUN/CREAT SERPL: 13.9 (ref 7–25)
CALCIUM SPEC-SCNC: 9.5 MG/DL (ref 8.6–10.5)
CHLORIDE SERPL-SCNC: 100 MMOL/L (ref 98–107)
CHOLEST SERPL-MCNC: 215 MG/DL (ref 0–200)
CO2 SERPL-SCNC: 26.5 MMOL/L (ref 22–29)
CREAT SERPL-MCNC: 0.72 MG/DL (ref 0.57–1)
DEPRECATED RDW RBC AUTO: 42.7 FL (ref 37–54)
EGFRCR SERPLBLD CKD-EPI 2021: 107.2 ML/MIN/1.73
ERYTHROCYTE [DISTWIDTH] IN BLOOD BY AUTOMATED COUNT: 13.6 % (ref 12.3–15.4)
GLOBULIN UR ELPH-MCNC: 2.5 GM/DL
GLUCOSE SERPL-MCNC: 89 MG/DL (ref 65–99)
HBA1C MFR BLD: 5.6 % (ref 4.8–5.6)
HCT VFR BLD AUTO: 42.9 % (ref 34–46.6)
HDLC SERPL-MCNC: 51 MG/DL (ref 40–60)
HGB BLD-MCNC: 14.4 G/DL (ref 12–15.9)
LDLC SERPL CALC-MCNC: 142 MG/DL (ref 0–100)
LDLC/HDLC SERPL: 2.73 {RATIO}
MCH RBC QN AUTO: 29 PG (ref 26.6–33)
MCHC RBC AUTO-ENTMCNC: 33.6 G/DL (ref 31.5–35.7)
MCV RBC AUTO: 86.5 FL (ref 79–97)
PLATELET # BLD AUTO: 267 10*3/MM3 (ref 140–450)
PMV BLD AUTO: 10.6 FL (ref 6–12)
POTASSIUM SERPL-SCNC: 4.1 MMOL/L (ref 3.5–5.2)
PROT SERPL-MCNC: 7.1 G/DL (ref 6–8.5)
RBC # BLD AUTO: 4.96 10*6/MM3 (ref 3.77–5.28)
SODIUM SERPL-SCNC: 139 MMOL/L (ref 136–145)
TRIGL SERPL-MCNC: 125 MG/DL (ref 0–150)
TSH SERPL DL<=0.05 MIU/L-ACNC: 1.48 UIU/ML (ref 0.27–4.2)
VLDLC SERPL-MCNC: 22 MG/DL (ref 5–40)
WBC NRBC COR # BLD: 11.31 10*3/MM3 (ref 3.4–10.8)

## 2023-08-18 NOTE — TELEPHONE ENCOUNTER
73 female h/o metastatic pancreatic ca on chemo, dvt/pe previously on eliquis d/c'd yesterday due to thrombocytopenia, hypothyroid, here with [facility] with recent admission for metabolic encephalopathy and thrombocytopenia. GIB x 1 week with increasing somnolence.  pt with metastatic pancreatic cA s/p chemo with hypotension and change of mental status and lethargy  r/o sepsis  iv abx  iv hydration/albumin  pt is not DNR  Pharmacy called because patient is taking the albuterol more frequently and is paying out of pocket because she is getting it sooner than it is due. The script you sent in yesterday had the same directions as before.

## 2023-08-29 DIAGNOSIS — F90.9 ATTENTION DEFICIT HYPERACTIVITY DISORDER (ADHD), UNSPECIFIED ADHD TYPE: Primary | ICD-10-CM

## 2023-09-08 RX ORDER — ALBUTEROL SULFATE 90 UG/1
2 AEROSOL, METERED RESPIRATORY (INHALATION) EVERY 4 HOURS PRN
Qty: 18 G | Refills: 2 | Status: SHIPPED | OUTPATIENT
Start: 2023-09-08

## 2023-09-21 ENCOUNTER — OFFICE VISIT (OUTPATIENT)
Dept: FAMILY MEDICINE CLINIC | Facility: CLINIC | Age: 43
End: 2023-09-21
Payer: MEDICAID

## 2023-09-21 VITALS
HEART RATE: 107 BPM | OXYGEN SATURATION: 95 % | BODY MASS INDEX: 42.59 KG/M2 | DIASTOLIC BLOOD PRESSURE: 78 MMHG | SYSTOLIC BLOOD PRESSURE: 122 MMHG | WEIGHT: 265 LBS | HEIGHT: 66 IN

## 2023-09-21 DIAGNOSIS — I10 PRIMARY HYPERTENSION: Primary | ICD-10-CM

## 2023-09-21 DIAGNOSIS — Z71.6 ENCOUNTER FOR SMOKING CESSATION COUNSELING: ICD-10-CM

## 2023-09-21 DIAGNOSIS — F90.9 ATTENTION DEFICIT HYPERACTIVITY DISORDER (ADHD), UNSPECIFIED ADHD TYPE: ICD-10-CM

## 2023-09-21 DIAGNOSIS — G47.33 OSA (OBSTRUCTIVE SLEEP APNEA): ICD-10-CM

## 2023-09-21 DIAGNOSIS — E66.01 CLASS 3 SEVERE OBESITY WITH SERIOUS COMORBIDITY AND BODY MASS INDEX (BMI) OF 40.0 TO 44.9 IN ADULT, UNSPECIFIED OBESITY TYPE: ICD-10-CM

## 2023-09-21 PROCEDURE — 1159F MED LIST DOCD IN RCRD: CPT | Performed by: NURSE PRACTITIONER

## 2023-09-21 PROCEDURE — 99214 OFFICE O/P EST MOD 30 MIN: CPT | Performed by: NURSE PRACTITIONER

## 2023-09-21 PROCEDURE — 3078F DIAST BP <80 MM HG: CPT | Performed by: NURSE PRACTITIONER

## 2023-09-21 PROCEDURE — 3074F SYST BP LT 130 MM HG: CPT | Performed by: NURSE PRACTITIONER

## 2023-09-21 PROCEDURE — 1160F RVW MEDS BY RX/DR IN RCRD: CPT | Performed by: NURSE PRACTITIONER

## 2023-09-21 RX ORDER — BUDESONIDE AND FORMOTEROL FUMARATE DIHYDRATE 160; 4.5 UG/1; UG/1
2 AEROSOL RESPIRATORY (INHALATION)
Qty: 30 EACH | Refills: 1 | Status: SHIPPED | OUTPATIENT
Start: 2023-09-21 | End: 2023-10-21

## 2023-09-21 RX ORDER — SEMAGLUTIDE 0.25 MG/.5ML
0.25 INJECTION, SOLUTION SUBCUTANEOUS WEEKLY
Qty: 2 ML | Refills: 0 | Status: SHIPPED | OUTPATIENT
Start: 2023-09-21 | End: 2023-10-21

## 2023-09-21 RX ORDER — LISINOPRIL 20 MG/1
20 TABLET ORAL DAILY
Qty: 90 TABLET | Refills: 2 | Status: SHIPPED | OUTPATIENT
Start: 2023-09-21

## 2023-09-21 RX ORDER — ALBUTEROL SULFATE 90 UG/1
2 AEROSOL, METERED RESPIRATORY (INHALATION) EVERY 4 HOURS PRN
Qty: 18 G | Refills: 2 | Status: SHIPPED | OUTPATIENT
Start: 2023-09-21

## 2023-09-21 RX ORDER — NICOTINE 21 MG/24HR
1 PATCH, TRANSDERMAL 24 HOURS TRANSDERMAL EVERY 24 HOURS
Qty: 30 PATCH | Refills: 1 | Status: SHIPPED | OUTPATIENT
Start: 2023-09-21 | End: 2023-10-21

## 2023-09-21 RX ORDER — BUPROPION HYDROCHLORIDE 100 MG/1
100 TABLET, EXTENDED RELEASE ORAL 2 TIMES DAILY
Qty: 60 TABLET | Refills: 1 | Status: SHIPPED | OUTPATIENT
Start: 2023-09-21

## 2023-09-21 NOTE — ASSESSMENT & PLAN NOTE
Patient's (Body mass index is 42.77 kg/m².) indicates that they are morbidly/severely obese (BMI > 40 or > 35 with obesity - related health condition) with health conditions that include obstructive sleep apnea, hypertension, and GERD . Weight is newly identified. BMI  is above average; BMI management plan is completed. We discussed portion control, increasing exercise, management of depression/anxiety/stress to control compensatory eating, decreasing alcohol consumption, and pharmacologic options including Wegovy 0.25 mg weekly .  Follow-up in 6 weeks

## 2023-09-21 NOTE — PROGRESS NOTES
"Chief Complaint  Follow-up (6 week follow up obesity/ADHD)    Subjective        Kenia Noyola presents to [facility]  History of Present Illness    Patient presents for f/u visit.    Patient has hypertension, prescribed Lisinopril 20 mg daily. Patient denies dizziness, headache, chest pain or vision changes.     Patient started on Vyvanse 20 mg, increased to 30 mg daily and she is taking as prescribed. Patient described previously having trouble wrapping up projects, difficulty organizing, difficulty with remembering obligations, makes careless mistakes, has difficulty keeping attention and is easily distracted.  She notes improvement with the Vyvanse, feels that symptoms could be better controlled.    Patient is a current every day smoker, urges have increased since starting Vyvanse.  She has failed Chantix in the past.  Patient interested in other treatment options.    Patient presented previously with concerns with her weight. She has tried walking in the past without results. Patient has tried changing to well balanced, healthy diet and a total loss of 5 pounds. She reports tried and fails different things but never met with success.  Labs completed, otherwise unremarkable.  Patient is interested in Wegovy.    Patient is wearing CPAP nightly for KARAN.    Objective   Vital Signs:  /78 (BP Location: Left arm, Patient Position: Sitting, Cuff Size: Large Adult)   Pulse 107   Ht 167.6 cm (66\")   Wt 120 kg (265 lb)   SpO2 95%   BMI 42.77 kg/m²   Estimated body mass index is 42.77 kg/m² as calculated from the following:    Height as of this encounter: 167.6 cm (66\").    Weight as of this encounter: 120 kg (265 lb).          Physical Exam  Constitutional:       Appearance: Normal appearance. She is obese.   HENT:      Head: Normocephalic.   Cardiovascular:      Rate and Rhythm: Normal rate and regular rhythm.   Pulmonary:      Effort: Pulmonary effort is normal.      Breath " sounds: Normal breath sounds.   Abdominal:      General: Abdomen is flat. Bowel sounds are normal.      Palpations: Abdomen is soft.   Musculoskeletal:         General: Normal range of motion.      Cervical back: Neck supple.      Right lower leg: No edema.      Left lower leg: No edema.   Skin:     General: Skin is warm and dry.   Neurological:      Mental Status: She is alert and oriented to person, place, and time.      Gait: Gait is intact.   Psychiatric:         Attention and Perception: Attention normal.         Mood and Affect: Mood normal.         Speech: Speech normal.      Result Review :    CMP          8/15/2023    16:10   CMP   Glucose 89    BUN 10    Creatinine 0.72    EGFR 107.2    Sodium 139    Potassium 4.1    Chloride 100    Calcium 9.5    Total Protein 7.1    Albumin 4.6    Globulin 2.5    Total Bilirubin 0.4    Alkaline Phosphatase 49    AST (SGOT) 17    ALT (SGPT) 16    Albumin/Globulin Ratio 1.8    BUN/Creatinine Ratio 13.9    Anion Gap 12.5      CBC          8/15/2023    16:10   CBC   WBC 11.31    RBC 4.96    Hemoglobin 14.4    Hematocrit 42.9    MCV 86.5    MCH 29.0    MCHC 33.6    RDW 13.6    Platelets 267      Lipid Panel          8/15/2023    16:10   Lipid Panel   Total Cholesterol 215    Triglycerides 125    HDL Cholesterol 51    VLDL Cholesterol 22    LDL Cholesterol  142    LDL/HDL Ratio 2.73      TSH          8/15/2023    16:10   TSH   TSH 1.480      Most Recent A1C          8/15/2023    16:10   HGBA1C Most Recent   Hemoglobin A1C 5.60                   Assessment and Plan   Diagnoses and all orders for this visit:    1. Primary hypertension (Primary)  Assessment & Plan:  At goal of < 140/90  Continue lisinopril as prescribed      2. Class 3 severe obesity with serious comorbidity and body mass index (BMI) of 40.0 to 44.9 in adult, unspecified obesity type  Assessment & Plan:  Patient's (Body mass index is 42.77 kg/m².) indicates that they are morbidly/severely obese (BMI > 40 or > 35  with obesity - related health condition) with health conditions that include obstructive sleep apnea, hypertension, and GERD . Weight is newly identified. BMI  is above average; BMI management plan is completed. We discussed portion control, increasing exercise, management of depression/anxiety/stress to control compensatory eating, decreasing alcohol consumption, and pharmacologic options including Wegovy 0.25 mg weekly .  Follow-up in 6 weeks      3. KARAN (obstructive sleep apnea)  Assessment & Plan:  Continue nightly use of CPAP      4. Attention deficit hyperactivity disorder (ADHD), unspecified ADHD type  Assessment & Plan:  Improving  Increase Vyvanse to 40 mg daily    Orders:  -     lisdexamfetamine (Vyvanse) 40 MG capsule; Take 1 capsule by mouth Every Morning  Dispense: 30 capsule; Refill: 0    5. Encounter for smoking cessation counseling  Assessment & Plan:  Start Wellbutrin 100 mg twice daily  May use NicoDerm patches as well      Other orders  -     buPROPion SR (Wellbutrin SR) 100 MG 12 hr tablet; Take 1 tablet by mouth 2 (Two) Times a Day.  Dispense: 60 tablet; Refill: 1  -     albuterol sulfate  (90 Base) MCG/ACT inhaler; Inhale 2 puffs Every 4 (Four) Hours As Needed for Wheezing or Shortness of Air.  Dispense: 18 g; Refill: 2  -     budesonide-formoterol (SYMBICORT) 160-4.5 MCG/ACT inhaler; Inhale 2 puffs 2 (Two) Times a Day for 30 days.  Dispense: 30 each; Refill: 1  -     nicotine (NICODERM CQ) 21 MG/24HR patch; Place 1 patch on the skin as directed by provider Daily for 30 days.  Dispense: 30 patch; Refill: 1  -     lisinopril (PRINIVIL,ZESTRIL) 20 MG tablet; Take 1 tablet by mouth Daily.  Dispense: 90 tablet; Refill: 2  -     Semaglutide-Weight Management (Wegovy) 0.25 MG/0.5ML solution auto-injector; Inject 0.25 mg under the skin into the appropriate area as directed 1 (One) Time Per Week for 30 days.  Dispense: 2 mL; Refill: 0           I spent 30 minutes caring for Kenia on this date of  service. This time includes time spent by me in the following activities:preparing for the visit, reviewing tests, obtaining and/or reviewing a separately obtained history, performing a medically appropriate examination and/or evaluation , counseling and educating the patient/family/caregiver, ordering medications, tests, or procedures, documenting information in the medical record, and care coordination  Follow Up   Return in about 6 weeks (around 11/2/2023) for Obesity.  Patient was given instructions and counseling regarding her condition or for health maintenance advice. Please see specific information pulled into the AVS if appropriate.

## 2023-10-10 RX ORDER — ALBUTEROL SULFATE 90 UG/1
AEROSOL, METERED RESPIRATORY (INHALATION)
Qty: 18 G | Refills: 1 | Status: SHIPPED | OUTPATIENT
Start: 2023-10-10

## 2023-10-19 RX ORDER — ALBUTEROL SULFATE 90 UG/1
AEROSOL, METERED RESPIRATORY (INHALATION)
Qty: 18 G | Refills: 0 | Status: SHIPPED | OUTPATIENT
Start: 2023-10-19

## 2023-11-01 DIAGNOSIS — F90.9 ATTENTION DEFICIT HYPERACTIVITY DISORDER (ADHD), UNSPECIFIED ADHD TYPE: ICD-10-CM

## 2023-11-01 RX ORDER — LISDEXAMFETAMINE DIMESYLATE CAPSULES 30 MG/1
30 CAPSULE ORAL EVERY MORNING
Qty: 30 CAPSULE | Refills: 0 | Status: SHIPPED | OUTPATIENT
Start: 2023-11-01

## 2023-11-03 RX ORDER — BUDESONIDE AND FORMOTEROL FUMARATE DIHYDRATE 160; 4.5 UG/1; UG/1
2 AEROSOL RESPIRATORY (INHALATION) 2 TIMES DAILY
Qty: 10.2 G | Refills: 1 | Status: SHIPPED | OUTPATIENT
Start: 2023-11-03

## 2023-11-03 RX ORDER — BUPROPION HYDROCHLORIDE 100 MG/1
100 TABLET, EXTENDED RELEASE ORAL 2 TIMES DAILY
Qty: 60 TABLET | Refills: 1 | Status: SHIPPED | OUTPATIENT
Start: 2023-11-03

## 2023-11-17 RX ORDER — NICOTINE 21 MG/24HR
1 PATCH, TRANSDERMAL 24 HOURS TRANSDERMAL EVERY 24 HOURS
Qty: 30 PATCH | Refills: 1 | Status: SHIPPED | OUTPATIENT
Start: 2023-11-17 | End: 2023-12-17

## 2023-11-17 RX ORDER — ALBUTEROL SULFATE 90 UG/1
2 AEROSOL, METERED RESPIRATORY (INHALATION) EVERY 4 HOURS PRN
Qty: 18 G | Refills: 0 | Status: SHIPPED | OUTPATIENT
Start: 2023-11-17

## 2023-11-24 RX ORDER — ALBUTEROL SULFATE 90 UG/1
2 AEROSOL, METERED RESPIRATORY (INHALATION) EVERY 4 HOURS PRN
Qty: 18 G | Refills: 0 | Status: CANCELLED | OUTPATIENT
Start: 2023-11-24

## 2023-11-27 RX ORDER — ALBUTEROL SULFATE 90 UG/1
2 AEROSOL, METERED RESPIRATORY (INHALATION) EVERY 4 HOURS PRN
Qty: 18 G | Refills: 0 | Status: SHIPPED | OUTPATIENT
Start: 2023-11-27

## 2023-12-22 DIAGNOSIS — F90.9 ATTENTION DEFICIT HYPERACTIVITY DISORDER (ADHD), UNSPECIFIED ADHD TYPE: ICD-10-CM

## 2023-12-22 RX ORDER — ALBUTEROL SULFATE 90 UG/1
2 AEROSOL, METERED RESPIRATORY (INHALATION) EVERY 4 HOURS PRN
Qty: 18 G | Refills: 0 | Status: SHIPPED | OUTPATIENT
Start: 2023-12-22

## 2023-12-22 RX ORDER — BUPROPION HYDROCHLORIDE 100 MG/1
100 TABLET, EXTENDED RELEASE ORAL 2 TIMES DAILY
Qty: 60 TABLET | Refills: 1 | Status: SHIPPED | OUTPATIENT
Start: 2023-12-22

## 2023-12-22 RX ORDER — LISDEXAMFETAMINE DIMESYLATE CAPSULES 30 MG/1
30 CAPSULE ORAL EVERY MORNING
Qty: 30 CAPSULE | Refills: 0 | Status: SHIPPED | OUTPATIENT
Start: 2023-12-22

## 2024-02-21 ENCOUNTER — OFFICE VISIT (OUTPATIENT)
Dept: FAMILY MEDICINE CLINIC | Facility: CLINIC | Age: 44
End: 2024-02-21
Payer: MEDICAID

## 2024-02-21 VITALS
DIASTOLIC BLOOD PRESSURE: 78 MMHG | OXYGEN SATURATION: 94 % | WEIGHT: 261 LBS | SYSTOLIC BLOOD PRESSURE: 128 MMHG | HEIGHT: 66 IN | BODY MASS INDEX: 41.95 KG/M2 | HEART RATE: 94 BPM

## 2024-02-21 DIAGNOSIS — G47.33 OSA (OBSTRUCTIVE SLEEP APNEA): Primary | ICD-10-CM

## 2024-02-21 DIAGNOSIS — R00.2 PALPITATIONS: ICD-10-CM

## 2024-02-21 DIAGNOSIS — J45.50 SEVERE PERSISTENT ASTHMA WITHOUT COMPLICATION: ICD-10-CM

## 2024-02-21 DIAGNOSIS — R25.2 MUSCLE CRAMPS: ICD-10-CM

## 2024-02-21 DIAGNOSIS — Z00.00 ANNUAL PHYSICAL EXAM: ICD-10-CM

## 2024-02-21 DIAGNOSIS — R21 RASH AND NONSPECIFIC SKIN ERUPTION: ICD-10-CM

## 2024-02-21 DIAGNOSIS — Z71.6 ENCOUNTER FOR SMOKING CESSATION COUNSELING: ICD-10-CM

## 2024-02-21 DIAGNOSIS — I10 PRIMARY HYPERTENSION: ICD-10-CM

## 2024-02-21 DIAGNOSIS — E55.9 VITAMIN D DEFICIENCY: ICD-10-CM

## 2024-02-21 DIAGNOSIS — J01.00 ACUTE NON-RECURRENT MAXILLARY SINUSITIS: ICD-10-CM

## 2024-02-21 DIAGNOSIS — F90.2 ATTENTION DEFICIT HYPERACTIVITY DISORDER (ADHD), COMBINED TYPE: ICD-10-CM

## 2024-02-21 DIAGNOSIS — Z12.31 SCREENING MAMMOGRAM FOR BREAST CANCER: ICD-10-CM

## 2024-02-21 DIAGNOSIS — K08.89 TOOTH PAIN: ICD-10-CM

## 2024-02-21 LAB
DEPRECATED RDW RBC AUTO: 39.2 FL (ref 37–54)
ERYTHROCYTE [DISTWIDTH] IN BLOOD BY AUTOMATED COUNT: 12.6 % (ref 12.3–15.4)
HCT VFR BLD AUTO: 43.7 % (ref 34–46.6)
HGB BLD-MCNC: 14.9 G/DL (ref 12–15.9)
MCH RBC QN AUTO: 29.3 PG (ref 26.6–33)
MCHC RBC AUTO-ENTMCNC: 34.1 G/DL (ref 31.5–35.7)
MCV RBC AUTO: 86 FL (ref 79–97)
PLATELET # BLD AUTO: 253 10*3/MM3 (ref 140–450)
PMV BLD AUTO: 10.9 FL (ref 6–12)
RBC # BLD AUTO: 5.08 10*6/MM3 (ref 3.77–5.28)
WBC NRBC COR # BLD AUTO: 6.85 10*3/MM3 (ref 3.4–10.8)

## 2024-02-21 PROCEDURE — 80050 GENERAL HEALTH PANEL: CPT | Performed by: NURSE PRACTITIONER

## 2024-02-21 PROCEDURE — 82607 VITAMIN B-12: CPT | Performed by: NURSE PRACTITIONER

## 2024-02-21 PROCEDURE — 80061 LIPID PANEL: CPT | Performed by: NURSE PRACTITIONER

## 2024-02-21 PROCEDURE — 83735 ASSAY OF MAGNESIUM: CPT | Performed by: NURSE PRACTITIONER

## 2024-02-21 PROCEDURE — 82306 VITAMIN D 25 HYDROXY: CPT | Performed by: NURSE PRACTITIONER

## 2024-02-21 PROCEDURE — 83036 HEMOGLOBIN GLYCOSYLATED A1C: CPT | Performed by: NURSE PRACTITIONER

## 2024-02-21 RX ORDER — TEZEPELUMAB-EKKO 210 MG/1.9ML
INJECTION, SOLUTION SUBCUTANEOUS
COMMUNITY
Start: 2024-01-19

## 2024-02-21 RX ORDER — BUPROPION HYDROCHLORIDE 150 MG/1
150 TABLET ORAL DAILY
Qty: 30 TABLET | Refills: 1 | Status: SHIPPED | OUTPATIENT
Start: 2024-02-21

## 2024-02-21 RX ORDER — FLUTICASONE FUROATE, UMECLIDINIUM BROMIDE AND VILANTEROL TRIFENATATE 200; 62.5; 25 UG/1; UG/1; UG/1
POWDER RESPIRATORY (INHALATION)
COMMUNITY
Start: 2024-02-03

## 2024-02-21 RX ORDER — ALBUTEROL SULFATE AND BUDESONIDE 90; 80 UG/1; UG/1
AEROSOL, METERED RESPIRATORY (INHALATION)
COMMUNITY
Start: 2024-02-14

## 2024-02-21 RX ORDER — IPRATROPIUM BROMIDE AND ALBUTEROL SULFATE 2.5; .5 MG/3ML; MG/3ML
SOLUTION RESPIRATORY (INHALATION)
COMMUNITY
Start: 2023-12-27

## 2024-02-21 RX ORDER — AMOXICILLIN 500 MG/1
500 CAPSULE ORAL 2 TIMES DAILY
Qty: 20 CAPSULE | Refills: 0 | Status: SHIPPED | OUTPATIENT
Start: 2024-02-21 | End: 2024-03-02

## 2024-02-21 RX ORDER — EPINEPHRINE 0.3 MG/.3ML
INJECTION SUBCUTANEOUS
COMMUNITY
Start: 2024-01-26

## 2024-02-21 NOTE — PROGRESS NOTES
Venipuncture Blood Specimen Collection  Venipuncture performed in the right arm by Kaylen Charles MA with good hemostasis. Patient tolerated the procedure well without complications.   02/21/24   Kaylen Charles MA

## 2024-02-21 NOTE — PROGRESS NOTES
"Chief Complaint  Annual Exam (Would like to discuss having a check up with cardiology because she has \"strange beats\" at times ), Mass (Lump on right ear that has been there for 6 weeks ), and Dental Injury (Tooth infection would like to get an antibiotic/also has a fever of dentist and would like something to help have this treated.)    Subjective        Kenia Noyola presents to [facility]  History of Present Illness    Patient presents for Annual Exam without pap smear.  PMH includes ADD, allergies, asthma, depression, hypertension. Patient is taking Vyvanse 30 mg daily for ADHD. Sx are stable, has no acute complaints. Patient is wearing CPAP nightly for KARAN. Patient has Asthma, using Trelegy 1 puff daily. Patient also using duoneb treatments and Airsupra PRN.  She has accompanying allergies, followed by ENT. Patient reports will be starting Tezspire injections.   Patient is a current every day smoker, previously started on Wellbutrin twice daily but reports she is consistently forgetting the second dose.  She is taking Lisinopril 20 mg daily for hypertension, stable.     Patient is c/o lump to right ear for approximately 6 weeks, decreasing in size. Area was tender to start, now no longer painful.     Patient also c/o \"weird beats\" intermittently. She describes like her heart is \"playing around.\" Patient has family hx CAD, atrial fibrillation.     Patient also having dental pain, right side. She has strong fear of dentist, has not scheduled but reports pain is worsening.  Patient reports the last time she visited the dentist, had 2 syncopal episodes in the waiting room due to severe anxiety.  She is wanting to know if something can be is prescribed for severe anxiety prior to dentist appointment.  Patient also having sinus pain/pressure with productive cough.     Patient also c/o muscle cramping, generalized to flank, chest. She reports drinking nothing but water. No known " "aggravating factors.     PHQ-9 Depression Screening  Little interest or pleasure in doing things? 0-->not at all   Feeling down, depressed, or hopeless? 0-->not at all   Trouble falling or staying asleep, or sleeping too much?     Feeling tired or having little energy?     Poor appetite or overeating?     Feeling bad about yourself - or that you are a failure or have let yourself or your family down?     Trouble concentrating on things, such as reading the newspaper or watching television?     Moving or speaking so slowly that other people could have noticed? Or the opposite - being so fidgety or restless that you have been moving around a lot more than usual?     Thoughts that you would be better off dead, or of hurting yourself in some way?     PHQ-9 Total Score 0   If you checked off any problems, how difficult have these problems made it for you to do your work, take care of things at home, or get along with other people?          Objective   Vital Signs:  /78 (BP Location: Left arm, Patient Position: Sitting, Cuff Size: Large Adult)   Pulse 94   Ht 167.6 cm (66\")   Wt 118 kg (261 lb)   SpO2 94%   BMI 42.13 kg/m²   Estimated body mass index is 42.13 kg/m² as calculated from the following:    Height as of this encounter: 167.6 cm (66\").    Weight as of this encounter: 118 kg (261 lb).               Physical Exam  Constitutional:       Appearance: Normal appearance. She is obese.   HENT:      Head: Normocephalic.      Right Ear: Tympanic membrane normal.      Left Ear: Tympanic membrane normal.      Ears:        Mouth/Throat:      Pharynx: Oropharynx is clear.   Cardiovascular:      Rate and Rhythm: Normal rate and regular rhythm.   Pulmonary:      Effort: Pulmonary effort is normal.      Breath sounds: Normal breath sounds.   Abdominal:      General: Abdomen is flat. Bowel sounds are normal.      Palpations: Abdomen is soft.   Musculoskeletal:         General: Normal range of motion.      Cervical " back: Neck supple.      Right lower leg: No edema.      Left lower leg: No edema.   Skin:     General: Skin is warm and dry.   Neurological:      Mental Status: She is alert and oriented to person, place, and time.      Gait: Gait is intact.   Psychiatric:         Attention and Perception: Attention normal.         Mood and Affect: Mood normal.         Speech: Speech normal.        Result Review :      CMP          8/15/2023    16:10   CMP   Glucose 89    BUN 10    Creatinine 0.72    EGFR 107.2    Sodium 139    Potassium 4.1    Chloride 100    Calcium 9.5    Total Protein 7.1    Albumin 4.6    Globulin 2.5    Total Bilirubin 0.4    Alkaline Phosphatase 49    AST (SGOT) 17    ALT (SGPT) 16    Albumin/Globulin Ratio 1.8    BUN/Creatinine Ratio 13.9    Anion Gap 12.5      CBC          8/15/2023    16:10   CBC   WBC 11.31    RBC 4.96    Hemoglobin 14.4    Hematocrit 42.9    MCV 86.5    MCH 29.0    MCHC 33.6    RDW 13.6    Platelets 267      Lipid Panel          8/15/2023    16:10   Lipid Panel   Total Cholesterol 215    Triglycerides 125    HDL Cholesterol 51    VLDL Cholesterol 22    LDL Cholesterol  142    LDL/HDL Ratio 2.73      TSH          8/15/2023    16:10   TSH   TSH 1.480      Most Recent A1C          8/15/2023    16:10   HGBA1C Most Recent   Hemoglobin A1C 5.60                   Assessment and Plan     Diagnoses and all orders for this visit:    1. KARAN (obstructive sleep apnea) (Primary)  Assessment & Plan:  Nightly use of CPAP      2. Severe persistent asthma without complication  Assessment & Plan:  Continue Trelegy 1 puff daily  DuoNeb treatments as needed as well as airsupra as needed            3. Attention deficit hyperactivity disorder (ADHD), combined type  Assessment & Plan:  Stable on Vyvanse 30 mg daily      4. Primary hypertension  Assessment & Plan:  At goal of less than 140/90  Continue lisinopril as prescribed      5. Palpitations  Assessment & Plan:  EKG shows normal sinus rhythm, no comparable  data  Holter monitor ordered    Orders:  -     ECG 12 Lead  -     Holter Monitor - 72 Hour Up To 15 Days; Future    6. Tooth pain  Assessment & Plan:  Amoxicillin twice daily x 10 days  Will prescribe Ativan for her to use as needed for severe anxiety    Orders:  -     amoxicillin (AMOXIL) 500 MG capsule; Take 1 capsule by mouth 2 (Two) Times a Day for 10 days.  Dispense: 20 capsule; Refill: 0    7. Acute non-recurrent maxillary sinusitis  -     amoxicillin (AMOXIL) 500 MG capsule; Take 1 capsule by mouth 2 (Two) Times a Day for 10 days.  Dispense: 20 capsule; Refill: 0    8. Annual physical exam  Assessment & Plan:  Labs today  Mammogram ordered  Pap smear per GYN  Routine vision and dental screenings advised    Orders:  -     CBC (No Diff)  -     Comprehensive Metabolic Panel  -     Hemoglobin A1c  -     Lipid Panel  -     TSH    9. Screening mammogram for breast cancer  -     Mammo Screening Digital Tomosynthesis Bilateral With CAD; Future    10. Vitamin D deficiency  -     Vitamin D,25-Hydroxy    11. Muscle cramps  -     Vitamin D,25-Hydroxy  -     Vitamin B12  -     Magnesium    12. Rash and nonspecific skin eruption  Comments:  1. Mupirocin ointment BID    13. Encounter for smoking cessation counseling  -     buPROPion XL (Wellbutrin XL) 150 MG 24 hr tablet; Take 1 tablet by mouth Daily.  Dispense: 30 tablet; Refill: 1    Other orders  -     mupirocin (BACTROBAN) 2 % ointment; Apply 1 Application topically to the appropriate area as directed 3 (Three) Times a Day.  Dispense: 30 g; Refill: 0           I spent 40 minutes caring for Kenia on this date of service. This time includes time spent by me in the following activities:preparing for the visit, reviewing tests, obtaining and/or reviewing a separately obtained history, performing a medically appropriate examination and/or evaluation , counseling and educating the patient/family/caregiver, ordering medications, tests, or procedures, documenting information in  the medical record, and care coordination  Follow Up     Return in about 3 months (around 5/21/2024) for Palpitations/Asthma.  Patient was given instructions and counseling regarding her condition or for health maintenance advice. Please see specific information pulled into the AVS if appropriate.     Counseling was given to patient for the following topics: instructions for management, risk factor reductions, prognosis, patient and family education, impressions, risks and benefits of treatment options, and importance of treatment compliance . Total time of the encounter was 30 minutes and 5 minutes was spent counseling.

## 2024-02-21 NOTE — ASSESSMENT & PLAN NOTE
Amoxicillin twice daily x 10 days  Will prescribe Ativan for her to use as needed for severe anxiety

## 2024-02-22 LAB
25(OH)D3 SERPL-MCNC: 26.4 NG/ML (ref 30–100)
ALBUMIN SERPL-MCNC: 4.2 G/DL (ref 3.5–5.2)
ALBUMIN/GLOB SERPL: 1.5 G/DL
ALP SERPL-CCNC: 48 U/L (ref 39–117)
ALT SERPL W P-5'-P-CCNC: 20 U/L (ref 1–33)
ANION GAP SERPL CALCULATED.3IONS-SCNC: 12.5 MMOL/L (ref 5–15)
AST SERPL-CCNC: 20 U/L (ref 1–32)
BILIRUB SERPL-MCNC: 0.4 MG/DL (ref 0–1.2)
BUN SERPL-MCNC: 11 MG/DL (ref 6–20)
BUN/CREAT SERPL: 13.3 (ref 7–25)
CALCIUM SPEC-SCNC: 9 MG/DL (ref 8.6–10.5)
CHLORIDE SERPL-SCNC: 102 MMOL/L (ref 98–107)
CHOLEST SERPL-MCNC: 168 MG/DL (ref 0–200)
CO2 SERPL-SCNC: 25.5 MMOL/L (ref 22–29)
CREAT SERPL-MCNC: 0.83 MG/DL (ref 0.57–1)
EGFRCR SERPLBLD CKD-EPI 2021: 89.8 ML/MIN/1.73
GLOBULIN UR ELPH-MCNC: 2.8 GM/DL
GLUCOSE SERPL-MCNC: 90 MG/DL (ref 65–99)
HBA1C MFR BLD: 5.3 % (ref 4.8–5.6)
HDLC SERPL-MCNC: 42 MG/DL (ref 40–60)
LDLC SERPL CALC-MCNC: 101 MG/DL (ref 0–100)
LDLC/HDLC SERPL: 2.32 {RATIO}
MAGNESIUM SERPL-MCNC: 2.2 MG/DL (ref 1.6–2.6)
POTASSIUM SERPL-SCNC: 3.8 MMOL/L (ref 3.5–5.2)
PROT SERPL-MCNC: 7 G/DL (ref 6–8.5)
SODIUM SERPL-SCNC: 140 MMOL/L (ref 136–145)
TRIGL SERPL-MCNC: 142 MG/DL (ref 0–150)
TSH SERPL DL<=0.05 MIU/L-ACNC: 1.72 UIU/ML (ref 0.27–4.2)
VIT B12 BLD-MCNC: 618 PG/ML (ref 211–946)
VLDLC SERPL-MCNC: 25 MG/DL (ref 5–40)

## 2024-03-04 DIAGNOSIS — K08.89 TOOTH PAIN: ICD-10-CM

## 2024-03-04 DIAGNOSIS — J01.00 ACUTE NON-RECURRENT MAXILLARY SINUSITIS: ICD-10-CM

## 2024-03-04 RX ORDER — ALBUTEROL SULFATE 90 UG/1
2 AEROSOL, METERED RESPIRATORY (INHALATION) EVERY 4 HOURS PRN
Qty: 6.7 G | OUTPATIENT
Start: 2024-03-04

## 2024-03-04 RX ORDER — NICOTINE 21 MG/24HR
PATCH, TRANSDERMAL 24 HOURS TRANSDERMAL
Qty: 28 PATCH | OUTPATIENT
Start: 2024-03-04

## 2024-03-05 ENCOUNTER — TELEPHONE (OUTPATIENT)
Dept: FAMILY MEDICINE CLINIC | Facility: CLINIC | Age: 44
End: 2024-03-05
Payer: MEDICAID

## 2024-03-05 DIAGNOSIS — F41.9 ANXIETY: Primary | ICD-10-CM

## 2024-03-05 RX ORDER — LORAZEPAM 0.5 MG/1
0.5 TABLET ORAL EVERY 8 HOURS PRN
Qty: 5 TABLET | Refills: 0 | Status: SHIPPED | OUTPATIENT
Start: 2024-03-05

## 2024-03-05 RX ORDER — AMOXICILLIN 500 MG/1
500 CAPSULE ORAL 2 TIMES DAILY
Qty: 20 CAPSULE | Refills: 0 | Status: SHIPPED | OUTPATIENT
Start: 2024-03-05 | End: 2024-03-15

## 2024-03-05 NOTE — TELEPHONE ENCOUNTER
Called [facility] pharmacy and gave clarification on prescription. It should be take 1 tab 1 hour prior to dental procedure and may repeat at the time of procedure if needed.

## 2024-03-05 NOTE — TELEPHONE ENCOUNTER
I sent over 5 tablets of Ativan for you to have on hand. Take 1 tablet one hour prior to dentist apt and another at time of apt if needed

## 2024-03-05 NOTE — TELEPHONE ENCOUNTER
Caller: [facility] - [place], [place] - [address] - [phone]  - [phone]    Relationship to patient: Pharmacy    Best call back number: [phone]    Patient is needing:     HAS SOME QUESTIONS ON THE INSTRUCTIONS FOR THE PRESCRIPTION:    LORazepam (Ativan) 0.5 MG tablet     STATES IT READS AS IF THERE ARE TWO  INSTRUCTIONS FOR DOSING.     PLEASE CALL TO DISCUSS.

## 2024-03-06 DIAGNOSIS — F90.9 ATTENTION DEFICIT HYPERACTIVITY DISORDER (ADHD), UNSPECIFIED ADHD TYPE: ICD-10-CM

## 2024-03-07 RX ORDER — LISDEXAMFETAMINE DIMESYLATE CAPSULES 30 MG/1
30 CAPSULE ORAL EVERY MORNING
Qty: 30 CAPSULE | Refills: 0 | Status: SHIPPED | OUTPATIENT
Start: 2024-03-07

## 2024-03-07 RX ORDER — IPRATROPIUM BROMIDE AND ALBUTEROL SULFATE 2.5; .5 MG/3ML; MG/3ML
3 SOLUTION RESPIRATORY (INHALATION)
Qty: 360 ML | Refills: 1 | Status: SHIPPED | OUTPATIENT
Start: 2024-03-07

## 2024-03-07 RX ORDER — ALBUTEROL SULFATE AND BUDESONIDE 90; 80 UG/1; UG/1
2 AEROSOL, METERED RESPIRATORY (INHALATION) EVERY 4 HOURS PRN
Qty: 8 G | Refills: 2 | Status: SHIPPED | OUTPATIENT
Start: 2024-03-07

## 2024-03-19 RX ORDER — IPRATROPIUM BROMIDE AND ALBUTEROL SULFATE 2.5; .5 MG/3ML; MG/3ML
3 SOLUTION RESPIRATORY (INHALATION)
Qty: 360 ML | Refills: 1 | Status: SHIPPED | OUTPATIENT
Start: 2024-03-19

## 2024-03-19 RX ORDER — ALBUTEROL SULFATE AND BUDESONIDE 90; 80 UG/1; UG/1
2 AEROSOL, METERED RESPIRATORY (INHALATION) EVERY 4 HOURS PRN
Qty: 8 G | Refills: 2 | Status: SHIPPED | OUTPATIENT
Start: 2024-03-19

## 2024-03-27 RX ORDER — SEMAGLUTIDE 0.25 MG/.5ML
INJECTION, SOLUTION SUBCUTANEOUS
Qty: 2 ML | Refills: 1 | Status: SHIPPED | OUTPATIENT
Start: 2024-03-27

## 2024-04-05 ENCOUNTER — TELEPHONE (OUTPATIENT)
Dept: FAMILY MEDICINE CLINIC | Facility: CLINIC | Age: 44
End: 2024-04-05

## 2024-04-05 RX ORDER — ONDANSETRON 4 MG/1
4 TABLET, FILM COATED ORAL EVERY 8 HOURS PRN
Qty: 30 TABLET | Refills: 0 | Status: SHIPPED | OUTPATIENT
Start: 2024-04-05

## 2024-04-05 RX ORDER — OSELTAMIVIR PHOSPHATE 75 MG/1
75 CAPSULE ORAL 2 TIMES DAILY
Qty: 10 CAPSULE | Refills: 0 | Status: SHIPPED | OUTPATIENT
Start: 2024-04-05 | End: 2024-04-10

## 2024-04-26 DIAGNOSIS — Z71.6 ENCOUNTER FOR SMOKING CESSATION COUNSELING: ICD-10-CM

## 2024-04-26 RX ORDER — BUPROPION HYDROCHLORIDE 150 MG/1
150 TABLET ORAL DAILY
Qty: 30 TABLET | Refills: 1 | Status: SHIPPED | OUTPATIENT
Start: 2024-04-26

## 2024-04-26 RX ORDER — LISINOPRIL 20 MG/1
20 TABLET ORAL DAILY
Qty: 90 TABLET | Refills: 2 | Status: SHIPPED | OUTPATIENT
Start: 2024-04-26

## 2024-04-28 RX ORDER — ALBUTEROL SULFATE AND BUDESONIDE 90; 80 UG/1; UG/1
2 AEROSOL, METERED RESPIRATORY (INHALATION) EVERY 4 HOURS PRN
Qty: 8 G | Refills: 2 | Status: SHIPPED | OUTPATIENT
Start: 2024-04-28

## 2024-04-28 RX ORDER — IPRATROPIUM BROMIDE AND ALBUTEROL SULFATE 2.5; .5 MG/3ML; MG/3ML
3 SOLUTION RESPIRATORY (INHALATION)
Qty: 360 ML | Refills: 1 | Status: SHIPPED | OUTPATIENT
Start: 2024-04-28

## 2024-05-01 DIAGNOSIS — F90.9 ATTENTION DEFICIT HYPERACTIVITY DISORDER (ADHD), UNSPECIFIED ADHD TYPE: ICD-10-CM

## 2024-05-01 RX ORDER — ALBUTEROL SULFATE 90 UG/1
2 AEROSOL, METERED RESPIRATORY (INHALATION) EVERY 4 HOURS PRN
Qty: 6.7 G | OUTPATIENT
Start: 2024-05-01

## 2024-05-01 RX ORDER — SEMAGLUTIDE 1 MG/.5ML
1 INJECTION, SOLUTION SUBCUTANEOUS WEEKLY
Qty: 2 ML | Refills: 0 | Status: SHIPPED | OUTPATIENT
Start: 2024-05-01 | End: 2024-05-31

## 2024-05-01 RX ORDER — LISDEXAMFETAMINE DIMESYLATE 30 MG/1
30 CAPSULE ORAL EVERY MORNING
Qty: 30 CAPSULE | Refills: 0 | Status: SHIPPED | OUTPATIENT
Start: 2024-05-01

## 2024-05-21 ENCOUNTER — TELEPHONE (OUTPATIENT)
Dept: FAMILY MEDICINE CLINIC | Facility: CLINIC | Age: 44
End: 2024-05-21
Payer: MEDICAID

## 2024-05-21 RX ORDER — METRONIDAZOLE 10 MG/G
GEL TOPICAL DAILY
Qty: 30 G | Refills: 0 | Status: SHIPPED | OUTPATIENT
Start: 2024-05-21

## 2024-06-07 ENCOUNTER — TELEPHONE (OUTPATIENT)
Dept: FAMILY MEDICINE CLINIC | Facility: CLINIC | Age: 44
End: 2024-06-07
Payer: MEDICAID

## 2024-06-10 DIAGNOSIS — Z71.6 ENCOUNTER FOR SMOKING CESSATION COUNSELING: ICD-10-CM

## 2024-06-10 DIAGNOSIS — F90.9 ATTENTION DEFICIT HYPERACTIVITY DISORDER (ADHD), UNSPECIFIED ADHD TYPE: ICD-10-CM

## 2024-06-10 RX ORDER — ALBUTEROL SULFATE AND BUDESONIDE 90; 80 UG/1; UG/1
2 AEROSOL, METERED RESPIRATORY (INHALATION) EVERY 4 HOURS PRN
Qty: 8 G | Refills: 2 | Status: SHIPPED | OUTPATIENT
Start: 2024-06-10

## 2024-06-10 RX ORDER — SEMAGLUTIDE 1 MG/.5ML
1 INJECTION, SOLUTION SUBCUTANEOUS WEEKLY
Qty: 2 ML | Refills: 0 | Status: SHIPPED | OUTPATIENT
Start: 2024-06-10 | End: 2024-07-10

## 2024-06-10 RX ORDER — BUPROPION HYDROCHLORIDE 150 MG/1
150 TABLET ORAL DAILY
Qty: 30 TABLET | Refills: 1 | Status: SHIPPED | OUTPATIENT
Start: 2024-06-10

## 2024-06-10 RX ORDER — SEMAGLUTIDE 1 MG/.5ML
1 INJECTION, SOLUTION SUBCUTANEOUS WEEKLY
Qty: 2 ML | Refills: 0 | OUTPATIENT
Start: 2024-06-10 | End: 2024-07-10

## 2024-06-10 RX ORDER — LISDEXAMFETAMINE DIMESYLATE 30 MG/1
30 CAPSULE ORAL EVERY MORNING
Qty: 30 CAPSULE | Refills: 0 | Status: SHIPPED | OUTPATIENT
Start: 2024-06-10

## 2024-07-03 DIAGNOSIS — F90.9 ATTENTION DEFICIT HYPERACTIVITY DISORDER (ADHD), UNSPECIFIED ADHD TYPE: ICD-10-CM

## 2024-07-05 RX ORDER — ALBUTEROL SULFATE AND BUDESONIDE 90; 80 UG/1; UG/1
2 AEROSOL, METERED RESPIRATORY (INHALATION) EVERY 4 HOURS PRN
Qty: 8 G | Refills: 2 | Status: SHIPPED | OUTPATIENT
Start: 2024-07-05

## 2024-07-05 RX ORDER — LISDEXAMFETAMINE DIMESYLATE 30 MG/1
30 CAPSULE ORAL EVERY MORNING
Qty: 30 CAPSULE | Refills: 0 | Status: SHIPPED | OUTPATIENT
Start: 2024-07-05

## 2024-07-08 ENCOUNTER — TELEPHONE (OUTPATIENT)
Dept: FAMILY MEDICINE CLINIC | Facility: CLINIC | Age: 44
End: 2024-07-08
Payer: MEDICAID

## 2024-07-08 NOTE — TELEPHONE ENCOUNTER
Received a PA request for Airsupra. Insurance has termed. Spoke with Kenia and stated that she is currently between insurances. She states she will pay out of pocket for vyvanse and will do so for the airsupra depending on cost.

## 2024-08-22 DIAGNOSIS — F90.9 ATTENTION DEFICIT HYPERACTIVITY DISORDER (ADHD), UNSPECIFIED ADHD TYPE: ICD-10-CM

## 2024-08-22 RX ORDER — LISDEXAMFETAMINE DIMESYLATE 30 MG/1
30 CAPSULE ORAL EVERY MORNING
Qty: 30 CAPSULE | Refills: 0 | Status: SHIPPED | OUTPATIENT
Start: 2024-08-22

## 2024-09-05 RX ORDER — IPRATROPIUM BROMIDE AND ALBUTEROL SULFATE 2.5; .5 MG/3ML; MG/3ML
SOLUTION RESPIRATORY (INHALATION)
Qty: 360 ML | Refills: 1 | Status: SHIPPED | OUTPATIENT
Start: 2024-09-05

## 2024-12-05 RX ORDER — IPRATROPIUM BROMIDE AND ALBUTEROL SULFATE 2.5; .5 MG/3ML; MG/3ML
SOLUTION RESPIRATORY (INHALATION)
Qty: 360 ML | Refills: 1 | Status: SHIPPED | OUTPATIENT
Start: 2024-12-05

## 2024-12-11 DIAGNOSIS — Z71.6 ENCOUNTER FOR SMOKING CESSATION COUNSELING: ICD-10-CM

## 2024-12-11 DIAGNOSIS — F90.9 ATTENTION DEFICIT HYPERACTIVITY DISORDER (ADHD), UNSPECIFIED ADHD TYPE: ICD-10-CM

## 2024-12-12 RX ORDER — BUPROPION HYDROCHLORIDE 150 MG/1
150 TABLET ORAL DAILY
Qty: 30 TABLET | Refills: 1 | Status: SHIPPED | OUTPATIENT
Start: 2024-12-12

## 2024-12-12 RX ORDER — LISDEXAMFETAMINE DIMESYLATE 30 MG/1
30 CAPSULE ORAL EVERY MORNING
Qty: 30 CAPSULE | Refills: 0 | Status: SHIPPED | OUTPATIENT
Start: 2024-12-12

## 2024-12-12 NOTE — TELEPHONE ENCOUNTER
This patient needs an appointment.  We refilled Vyvanse 3 months ago and she has not been seen since February.  To refill controlled substances, she has to be seen at least every 5 months

## 2024-12-20 ENCOUNTER — E-VISIT (OUTPATIENT)
Dept: FAMILY MEDICINE CLINIC | Facility: TELEHEALTH | Age: 44
End: 2024-12-20

## 2024-12-20 ENCOUNTER — E-VISIT (OUTPATIENT)
Dept: ADMINISTRATIVE | Facility: OTHER | Age: 44
End: 2024-12-20
Payer: MEDICAID

## 2024-12-20 ENCOUNTER — TELEPHONE (OUTPATIENT)
Dept: FAMILY MEDICINE CLINIC | Facility: TELEHEALTH | Age: 44
End: 2024-12-20

## 2024-12-20 DIAGNOSIS — R19.7 DIARRHEA, UNSPECIFIED TYPE: Primary | ICD-10-CM

## 2024-12-20 RX ORDER — LOPERAMIDE HYDROCHLORIDE 2 MG/1
2 TABLET ORAL 4 TIMES DAILY PRN
Qty: 16 TABLET | Refills: 0 | Status: SHIPPED | OUTPATIENT
Start: 2024-12-20

## 2024-12-20 NOTE — TELEPHONE ENCOUNTER
Caller: Kenia Noyola    Relationship: Self    Best call back number:     DennysKenia (Self) [phone] (Mobile)       What was the call regarding: PATIENT REQUESTED THE VIRTUAL VISIT ON [employer], BUT NO ONE CONTACTED THE PATIENT ABOUT SCHEDULING AND  CONFIRMING THE APPT     SHE DID GET CHARGED FOR THAT VISIT EVEN  THOUGH SHE WAS NOT SEEN     CAN YOU LOOK INTO THIS FOR HER       Is it okay if the provider responds through [employer]: CALL BACK AND ADVISE

## 2024-12-20 NOTE — E-VISIT TREATED
Date: 2024 10:52:56  Clinician: Diana Stark  Clinician NPI: [id number]  Patient: Kenia Noyola  Patient : 1980  Patient Address: [address] [place], [place] [address] [address]  Patient Phone: [phone]  Visit Protocol: URI  Patient Summary:  Kenia is a 44 year old ( : 1980 ) female who initiated a visit for cold, sinus infection, or influenza.     Kenia states the symptoms started suddenly 3-5 days ago. After the symptoms started, they improved and then got worse   again.   Symptom start date: 2024   The symptoms consist of anosmia or ageusia, diarrhea, chills, myalgia, nasal congestion, nausea, malaise, vomiting, and a headache. Kenia is experiencing mild difficulty breathing with daily activities but can   speak normally in full sentences.   Symptom details     Nasal secretions: The color of the mucus is clear.    Headache: The headache is mild (1-3 on a 10 point pain scale).      Kenia has an oximeter at home. The oxygen saturation reading is between %.   Kenia denies having teeth pain, wheezing, facial pain or pressure, sore throat, ear pain, rhinitis, cough, and fever. Kenia also denies taking antibiotic medication in   the past month, having recent facial or sinus surgery in the past 60 days, and having a sinus infection within the past year.   Precipitating events  Kenia has recently been exposed to someone with influenza. Kenia has been in close contact with the   following high risk individuals: pregnant women.    Kenia has not received the influenza vaccination.   Pertinent COVID-19 (Coronavirus) information  Since the symptoms started, Kenia has tested for COVID-19. The result of the test was negative.   Kenia   has not had COVID-19 in the last 3 months.   Kenia has received a COVID-19 vaccine in the past year.     Pertinent medical history   Kenia reports having the following immunosuppressive condition(s): long-term use of steroids or other immunosuppressive    medication   Kenia also reports having the following conditions:   Hypertension   Kenia has asthma. Kenia uses quick-relief inhaler more than two times per week and wakes up at night with asthma symptoms more than two times per month. Kenia refills the   quick-relief inhaler more than two times per year.   A provider has not told Kenia to avoid NSAIDs.   Kenia does not get yeast infections when an antibiotic is taken.   Kenia does not have diabetes.   Kenia smokes or uses smokeless tobacco. Kenia does   not vape or use other e-cigarette products.   Kenia denies pregnancy and denies breastfeeding.   Additional information as reported by the patient (free text): If fairly certain that I have Norovirus, but im hoping it's just food poisoning. I am still   very nauseous however the vomiting has subsided with prescription anti nausea medication I have leftover from a previous illness. The diarrhea has gotten progressively worse and more frequent though (every 10-30 mins). Keopectate isn't helping, and I've   been eating yogurt, crackers and bananas with no results. Is there anything else I can do/take to stop the diarrhea?   Weight: 255 lbs (115.67 kg)    MEDICATIONS: mupirocin topical, Trelegy Ellipta inhalation, ipratropium-albuterol inhalation, Tezspire subcutaneous, nicotine transdermal, metronidazole topical, lisinopril oral, Wegovy subcutaneous, lisdexamfetamine oral, Airsupra inhalation,   oseltamivir oral, bupropion HCl oral, epinephrine injection, lorazepam oral, ALLERGIES: NKDA  Clinician Response:  Dear Kenia,   Kenia   continue to drink clear liquids no milk or dairy. take Imodium 2 mg (2) tabs with first diarrhea  stool then 1 with each diarrhea stool thereafter, do not exceed 8 mg. per day. Avoid yogurt and dairy. Be sure to   follow up if you begin to have s/s of dehydration.   Diana Hollingsworth    Diagnosis: Diarrhea, unspecified  Diagnosis ICD: R19.7    Follow up instructions: ATTENTION: If you have  been prescribed medications, your prescriptions will not be sent until you choose your pharmacy.  To do so open the link within your notification, or go to [employer] and click eVisit in the menu to open your   treatment plan. From there, you can select your pharmacy at the bottom of your after visit summary. You can also go to [email or web address]  Prescriptions  Prescription: loperamide (Imodium A-D) 2 mg oral tablet, take 1 tablet by mouth every 4 hours for 3 days

## 2024-12-20 NOTE — E-VISIT ESCALATED
Status: Referred Out  Date: 2024 09:28:44  Acuity Level: Not applicable  Referral message: Based on the information you provided during your interview, eVisit is not appropriate for treating your condition.  Patient: Kenia Noyola  Patient : 1980  Patient Address: [address] [place] [address], [place] [address]  Patient Phone: [phone]  Clinician Response: Unavailable  Diagnosis: Unavailable  Diagnosis ICD: Unavailable     Patient Interview Questions and Responses: None available

## 2024-12-20 NOTE — E-VISIT ESCALATED
Status: Referred Out  Date: 2024 09:30:25  Acuity Level: Not applicable  Referral message: Based on the information you provided during your interview, eVisit is not appropriate for treating your condition.  Patient: Kenia Noyola  Patient : 1980  Patient Address: [address] [place] [address], [place] [address]  Patient Phone: [phone]  Clinician Response: Unavailable  Diagnosis: Unavailable  Diagnosis ICD: Unavailable     Patient Interview Questions and Responses: None available

## 2025-02-05 ENCOUNTER — LAB (OUTPATIENT)
Dept: FAMILY MEDICINE CLINIC | Facility: CLINIC | Age: 45
End: 2025-02-05
Payer: COMMERCIAL

## 2025-02-05 ENCOUNTER — OFFICE VISIT (OUTPATIENT)
Dept: FAMILY MEDICINE CLINIC | Facility: CLINIC | Age: 45
End: 2025-02-05
Payer: COMMERCIAL

## 2025-02-05 VITALS
HEART RATE: 104 BPM | BODY MASS INDEX: 41.46 KG/M2 | DIASTOLIC BLOOD PRESSURE: 80 MMHG | WEIGHT: 258 LBS | OXYGEN SATURATION: 94 % | SYSTOLIC BLOOD PRESSURE: 124 MMHG | HEIGHT: 66 IN

## 2025-02-05 DIAGNOSIS — Z23 NEED FOR TDAP VACCINATION: ICD-10-CM

## 2025-02-05 DIAGNOSIS — N92.6 IRREGULAR PERIODS: ICD-10-CM

## 2025-02-05 DIAGNOSIS — L65.9 ALOPECIA: ICD-10-CM

## 2025-02-05 DIAGNOSIS — L21.9 SEBORRHEIC DERMATITIS OF SCALP: ICD-10-CM

## 2025-02-05 DIAGNOSIS — I10 PRIMARY HYPERTENSION: ICD-10-CM

## 2025-02-05 DIAGNOSIS — G47.00 INSOMNIA, UNSPECIFIED TYPE: ICD-10-CM

## 2025-02-05 DIAGNOSIS — J45.50 SEVERE PERSISTENT ASTHMA WITHOUT COMPLICATION: ICD-10-CM

## 2025-02-05 DIAGNOSIS — Z71.6 ENCOUNTER FOR SMOKING CESSATION COUNSELING: ICD-10-CM

## 2025-02-05 DIAGNOSIS — E66.813 CLASS 3 SEVERE OBESITY WITH SERIOUS COMORBIDITY AND BODY MASS INDEX (BMI) OF 40.0 TO 44.9 IN ADULT, UNSPECIFIED OBESITY TYPE: ICD-10-CM

## 2025-02-05 DIAGNOSIS — F41.9 ANXIETY: ICD-10-CM

## 2025-02-05 DIAGNOSIS — Z00.00 PREVENTATIVE HEALTH CARE: ICD-10-CM

## 2025-02-05 DIAGNOSIS — R00.2 PALPITATIONS: ICD-10-CM

## 2025-02-05 DIAGNOSIS — E55.9 VITAMIN D DEFICIENCY: ICD-10-CM

## 2025-02-05 DIAGNOSIS — F90.9 ATTENTION DEFICIT HYPERACTIVITY DISORDER (ADHD), UNSPECIFIED ADHD TYPE: Primary | ICD-10-CM

## 2025-02-05 DIAGNOSIS — E66.01 CLASS 3 SEVERE OBESITY WITH SERIOUS COMORBIDITY AND BODY MASS INDEX (BMI) OF 40.0 TO 44.9 IN ADULT, UNSPECIFIED OBESITY TYPE: ICD-10-CM

## 2025-02-05 DIAGNOSIS — M25.551 BILATERAL HIP PAIN: ICD-10-CM

## 2025-02-05 DIAGNOSIS — Z12.31 SCREENING MAMMOGRAM FOR BREAST CANCER: ICD-10-CM

## 2025-02-05 DIAGNOSIS — G47.33 OSA (OBSTRUCTIVE SLEEP APNEA): ICD-10-CM

## 2025-02-05 DIAGNOSIS — M25.552 BILATERAL HIP PAIN: ICD-10-CM

## 2025-02-05 LAB
DEPRECATED RDW RBC AUTO: 40.4 FL (ref 37–54)
ERYTHROCYTE [DISTWIDTH] IN BLOOD BY AUTOMATED COUNT: 13 % (ref 12.3–15.4)
HCT VFR BLD AUTO: 45.8 % (ref 34–46.6)
HGB BLD-MCNC: 15.2 G/DL (ref 12–15.9)
INSULIN SERPL-ACNC: 21.5 UU/ML (ref 2–23)
MCH RBC QN AUTO: 29 PG (ref 26.6–33)
MCHC RBC AUTO-ENTMCNC: 33.2 G/DL (ref 31.5–35.7)
MCV RBC AUTO: 87.4 FL (ref 79–97)
PLATELET # BLD AUTO: 266 10*3/MM3 (ref 140–450)
PMV BLD AUTO: 10.8 FL (ref 6–12)
RBC # BLD AUTO: 5.24 10*6/MM3 (ref 3.77–5.28)
WBC NRBC COR # BLD AUTO: 7.34 10*3/MM3 (ref 3.4–10.8)

## 2025-02-05 PROCEDURE — 99214 OFFICE O/P EST MOD 30 MIN: CPT | Performed by: NURSE PRACTITIONER

## 2025-02-05 PROCEDURE — 82607 VITAMIN B-12: CPT | Performed by: NURSE PRACTITIONER

## 2025-02-05 PROCEDURE — 80061 LIPID PANEL: CPT | Performed by: NURSE PRACTITIONER

## 2025-02-05 PROCEDURE — 84443 ASSAY THYROID STIM HORMONE: CPT | Performed by: NURSE PRACTITIONER

## 2025-02-05 PROCEDURE — 82306 VITAMIN D 25 HYDROXY: CPT | Performed by: NURSE PRACTITIONER

## 2025-02-05 PROCEDURE — 83525 ASSAY OF INSULIN: CPT | Performed by: NURSE PRACTITIONER

## 2025-02-05 PROCEDURE — 83001 ASSAY OF GONADOTROPIN (FSH): CPT | Performed by: NURSE PRACTITIONER

## 2025-02-05 PROCEDURE — 85027 COMPLETE CBC AUTOMATED: CPT | Performed by: NURSE PRACTITIONER

## 2025-02-05 PROCEDURE — 83036 HEMOGLOBIN GLYCOSYLATED A1C: CPT | Performed by: NURSE PRACTITIONER

## 2025-02-05 PROCEDURE — 90715 TDAP VACCINE 7 YRS/> IM: CPT | Performed by: NURSE PRACTITIONER

## 2025-02-05 PROCEDURE — 80053 COMPREHEN METABOLIC PANEL: CPT | Performed by: NURSE PRACTITIONER

## 2025-02-05 PROCEDURE — 90471 IMMUNIZATION ADMIN: CPT | Performed by: NURSE PRACTITIONER

## 2025-02-05 PROCEDURE — 83002 ASSAY OF GONADOTROPIN (LH): CPT | Performed by: NURSE PRACTITIONER

## 2025-02-05 PROCEDURE — 36415 COLL VENOUS BLD VENIPUNCTURE: CPT | Performed by: NURSE PRACTITIONER

## 2025-02-05 RX ORDER — LISINOPRIL 20 MG/1
20 TABLET ORAL DAILY
Qty: 90 TABLET | Refills: 2 | Status: SHIPPED | OUTPATIENT
Start: 2025-02-05

## 2025-02-05 RX ORDER — CLOBETASOL PROPIONATE 0.5 MG/ML
SOLUTION TOPICAL 2 TIMES DAILY
Qty: 50 ML | Refills: 0 | Status: SHIPPED | OUTPATIENT
Start: 2025-02-05

## 2025-02-05 RX ORDER — BUDESONIDE AND FORMOTEROL FUMARATE DIHYDRATE 160; 4.5 UG/1; UG/1
2 AEROSOL RESPIRATORY (INHALATION)
COMMUNITY

## 2025-02-05 RX ORDER — BUPROPION HYDROCHLORIDE 150 MG/1
150 TABLET ORAL DAILY
Qty: 30 TABLET | Refills: 1 | Status: SHIPPED | OUTPATIENT
Start: 2025-02-05

## 2025-02-05 NOTE — PROGRESS NOTES
"Chief Complaint  Follow-up (Follow up ADHD ), Insomnia, and Alopecia (Increased issues with hair loss )    Subjective        Kenia Noyola presents to [facility]  History of Present Illness    Patient presents for follow-up visit, last seen 1 year ago.    Patient is taking Vyvanse 30 mg daily for ADHD. Patient is taking Wellbutrin 150 mg daily for anxiety. Sx are stable, has no acute complaints. Patient is having difficulty falling asleep. She is averaging 3-5 hours of sleep.     Patient is wearing CPAP nightly for KARAN. Patient has Asthma, using Symbicort 2 puffs twice daily. Patient also prescribed duoneb treatments and Airsupra PRN (not taking).  She has accompanying allergies, followed by ENT.  Patient also prescribed Tezspire injections - stopped taking.    Hypertension, stable on lisinopril 20 mg daily. Patient denies dizziness, headache, chest pain or syncope. She continues to have palpitations, family hx a-fib. Patient previously referred to Cardiology, did not schedule.     Patient continues to have concerns with hair loss, shedding. She has scalp irritation with red patches - new within the last month.     Obesity, current BMI 41.64. Patient is wanting to lose weight, previously on Ozempic. She only lost 10 pounds with medication. Patient feels that her diet is well balanced diet but is not exercising. She describe bilateral hip pain as well without injury.     Objective   Vital Signs:  /80 (BP Location: Left arm, Patient Position: Sitting, Cuff Size: Large Adult)   Pulse 104   Ht 167.6 cm (66\")   Wt 117 kg (258 lb)   SpO2 94%   BMI 41.64 kg/m²   Estimated body mass index is 41.64 kg/m² as calculated from the following:    Height as of this encounter: 167.6 cm (66\").    Weight as of this encounter: 117 kg (258 lb).    Class 3 Severe Obesity (BMI >=40). Obesity-related health conditions include the following: obstructive sleep apnea and hypertension. Obesity is " unchanged. BMI is is above average; BMI management plan is completed. We discussed portion control, increasing exercise, management of depression/anxiety/stress to control compensatory eating, and decreasing alcohol consumption.      Physical Exam  Constitutional:       Appearance: Normal appearance. She is obese.   HENT:      Head: Normocephalic.   Cardiovascular:      Rate and Rhythm: Normal rate and regular rhythm.   Pulmonary:      Effort: Pulmonary effort is normal.      Breath sounds: Normal breath sounds.   Abdominal:      General: Abdomen is flat. Bowel sounds are normal.      Palpations: Abdomen is soft.   Musculoskeletal:         General: Normal range of motion.      Cervical back: Neck supple.      Right lower leg: No edema.      Left lower leg: No edema.   Skin:     General: Skin is warm and dry.   Neurological:      Mental Status: She is alert and oriented to person, place, and time.      Gait: Gait is intact.   Psychiatric:         Attention and Perception: Attention normal.         Mood and Affect: Mood normal.         Speech: Speech normal.        Result Review :                Assessment and Plan   Diagnoses and all orders for this visit:    1. Attention deficit hyperactivity disorder (ADHD), unspecified ADHD type (Primary)  Assessment & Plan:  Psychological condition is stable.  Continue current treatment regimen.  Psychological condition  will be reassessed at the next regular appointment.      2. Anxiety  Assessment & Plan:  Stable on Wellbutrin 150 mg daily    Orders:  -     buPROPion XL (Wellbutrin XL) 150 MG 24 hr tablet; Take 1 tablet by mouth Daily.  Dispense: 30 tablet; Refill: 1    3. Severe persistent asthma without complication  Assessment & Plan:  Continue treatment regimen per ENT            4. KARAN (obstructive sleep apnea)  Assessment & Plan:  Recommend nightly use of CPAP      5. Primary hypertension  Assessment & Plan:  Hypertension is stable and controlled  Continue current  treatment regimen.  Dietary sodium restriction.  Weight loss.  Regular aerobic exercise.  Ambulatory blood pressure monitoring.  Blood pressure will be reassessed in 3 months.    Orders:  -     lisinopril (PRINIVIL,ZESTRIL) 20 MG tablet; Take 1 tablet by mouth Daily.  Dispense: 90 tablet; Refill: 2    6. Encounter for smoking cessation counseling  -     buPROPion XL (Wellbutrin XL) 150 MG 24 hr tablet; Take 1 tablet by mouth Daily.  Dispense: 30 tablet; Refill: 1    7. Vitamin D deficiency  -     Vitamin D,25-Hydroxy    8. Alopecia  -     Vitamin D,25-Hydroxy  -     Vitamin B12  -     Ambulatory Referral to Dermatology    9. Preventative health care  -     CBC (No Diff)  -     Comprehensive Metabolic Panel  -     Hemoglobin A1c  -     Lipid Panel  -     TSH    10. Seborrheic dermatitis of scalp  -     Ambulatory Referral to Dermatology  -     clobetasol (TEMOVATE) 0.05 % external solution; Apply  topically to the appropriate area as directed 2 (Two) Times a Day.  Dispense: 50 mL; Refill: 0    11. Screening mammogram for breast cancer  -     Mammo Screening Digital Tomosynthesis Bilateral With CAD; Future    12. Bilateral hip pain  -     XR Hips Bilateral With or Without Pelvis 2 View; Future    13. Class 3 severe obesity with serious comorbidity and body mass index (BMI) of 40.0 to 44.9 in adult, unspecified obesity type  Assessment & Plan:  Patient's (Body mass index is 41.64 kg/m².) indicates that they are morbidly/severely obese (BMI > 40 or > 35 with obesity - related health condition) with health conditions that include obstructive sleep apnea, hypertension, and dyslipidemias . Weight is worsening. BMI  is above average; BMI management plan is completed. We discussed portion control, increasing exercise, joining a fitness center or start home based exercise program, Weight Watchers or other Commercial based weight reduction program, management of depression/anxiety/stress to control compensatory eating, and  decreasing alcohol consumption.     Orders:  -     Insulin, Total; Future    14. Need for Tdap vaccination  -     Tdap Vaccine => 6yo IM (BOOSTRIX/ADACEL)    15. Insomnia, unspecified type  Assessment & Plan:  Recommend melatonin, l-theanine and magnesium glycinate  Call if no improvement      16. Irregular periods  -     FSH & LH    17. Palpitations  -     Ambulatory Referral to Cardiology           I spent 30 minutes caring for Kenia on this date of service. This time includes time spent by me in the following activities:preparing for the visit, reviewing tests, obtaining and/or reviewing a separately obtained history, performing a medically appropriate examination and/or evaluation , counseling and educating the patient/family/caregiver, ordering medications, tests, or procedures, documenting information in the medical record, and care coordination  Follow Up   Return in about 3 months (around 5/5/2025) for Obesity/HTN.  Patient was given instructions and counseling regarding her condition or for health maintenance advice. Please see specific information pulled into the AVS if appropriate.

## 2025-02-06 PROBLEM — N92.6 IRREGULAR PERIODS: Status: ACTIVE | Noted: 2025-02-06

## 2025-02-06 LAB
25(OH)D3 SERPL-MCNC: 27.6 NG/ML (ref 30–100)
ALBUMIN SERPL-MCNC: 4.3 G/DL (ref 3.5–5.2)
ALBUMIN/GLOB SERPL: 1.3 G/DL
ALP SERPL-CCNC: 57 U/L (ref 39–117)
ALT SERPL W P-5'-P-CCNC: 22 U/L (ref 1–33)
ANION GAP SERPL CALCULATED.3IONS-SCNC: 8.3 MMOL/L (ref 5–15)
AST SERPL-CCNC: 20 U/L (ref 1–32)
BILIRUB SERPL-MCNC: 0.3 MG/DL (ref 0–1.2)
BUN SERPL-MCNC: 10 MG/DL (ref 6–20)
BUN/CREAT SERPL: 11.4 (ref 7–25)
CALCIUM SPEC-SCNC: 9.9 MG/DL (ref 8.6–10.5)
CHLORIDE SERPL-SCNC: 102 MMOL/L (ref 98–107)
CHOLEST SERPL-MCNC: 215 MG/DL (ref 0–200)
CO2 SERPL-SCNC: 27.7 MMOL/L (ref 22–29)
CREAT SERPL-MCNC: 0.88 MG/DL (ref 0.57–1)
EGFRCR SERPLBLD CKD-EPI 2021: 83.2 ML/MIN/1.73
FSH SERPL-ACNC: 97.1 MIU/ML
GLOBULIN UR ELPH-MCNC: 3.3 GM/DL
GLUCOSE SERPL-MCNC: 105 MG/DL (ref 65–99)
HBA1C MFR BLD: 5.6 % (ref 4.8–5.6)
HDLC SERPL-MCNC: 52 MG/DL (ref 40–60)
LDLC SERPL CALC-MCNC: 141 MG/DL (ref 0–100)
LDLC/HDLC SERPL: 2.66 {RATIO}
LH SERPL-ACNC: 59.4 MIU/ML
POTASSIUM SERPL-SCNC: 4.3 MMOL/L (ref 3.5–5.2)
PROT SERPL-MCNC: 7.6 G/DL (ref 6–8.5)
SODIUM SERPL-SCNC: 138 MMOL/L (ref 136–145)
TRIGL SERPL-MCNC: 123 MG/DL (ref 0–150)
TSH SERPL DL<=0.05 MIU/L-ACNC: 1.52 UIU/ML (ref 0.27–4.2)
VIT B12 BLD-MCNC: 604 PG/ML (ref 211–946)
VLDLC SERPL-MCNC: 22 MG/DL (ref 5–40)

## 2025-02-06 NOTE — ASSESSMENT & PLAN NOTE
Psychological condition is stable.  Continue current treatment regimen.  Psychological condition  will be reassessed at the next regular appointment.   No

## 2025-02-06 NOTE — ASSESSMENT & PLAN NOTE
Patient's (Body mass index is 41.64 kg/m².) indicates that they are morbidly/severely obese (BMI > 40 or > 35 with obesity - related health condition) with health conditions that include obstructive sleep apnea, hypertension, and dyslipidemias . Weight is worsening. BMI  is above average; BMI management plan is completed. We discussed portion control, increasing exercise, joining a fitness center or start home based exercise program, Weight Watchers or other Commercial based weight reduction program, management of depression/anxiety/stress to control compensatory eating, and decreasing alcohol consumption.

## 2025-02-13 DIAGNOSIS — F90.9 ATTENTION DEFICIT HYPERACTIVITY DISORDER (ADHD), UNSPECIFIED ADHD TYPE: ICD-10-CM

## 2025-02-13 RX ORDER — LISDEXAMFETAMINE DIMESYLATE 30 MG/1
30 CAPSULE ORAL EVERY MORNING
Qty: 30 CAPSULE | Refills: 0 | Status: SHIPPED | OUTPATIENT
Start: 2025-02-13

## 2025-02-13 NOTE — TELEPHONE ENCOUNTER
Caller: Kenia Noyola    Relationship: Self    Best call back number:    [phone]       Requested Prescriptions:   Requested Prescriptions     Pending Prescriptions Disp Refills    lisdexamfetamine (Vyvanse) 30 MG capsule 30 capsule 0     Sig: Take 1 capsule by mouth Every Morning        Pharmacy where request should be sent: [id number] [place] [address] AT [place] [address] - [phone]  - [phone]      Last office visit with prescribing clinician: 2/5/2025   Last telemedicine visit with prescribing clinician: Visit date not found   Next office visit with prescribing clinician: 5/7/2025     Additional details provided by patient:  OUT    Does the patient have less than a 3 day supply:  [x] Yes  [] No    Would you like a call back once the refill request has been completed: [] Yes [] No    If the office needs to give you a call back, can they leave a voicemail: [] Yes [] No    [place]   02/13/25 10:32 EST

## 2025-02-25 ENCOUNTER — PATIENT MESSAGE (OUTPATIENT)
Dept: FAMILY MEDICINE CLINIC | Facility: CLINIC | Age: 45
End: 2025-02-25

## 2025-02-26 DIAGNOSIS — L21.9 SEBORRHEIC DERMATITIS OF SCALP: ICD-10-CM

## 2025-02-27 RX ORDER — CLOBETASOL PROPIONATE 0.5 MG/ML
SOLUTION TOPICAL 2 TIMES DAILY
Qty: 50 ML | Refills: 0 | Status: SHIPPED | OUTPATIENT
Start: 2025-02-27

## 2025-03-28 RX ORDER — IPRATROPIUM BROMIDE AND ALBUTEROL SULFATE 2.5; .5 MG/3ML; MG/3ML
SOLUTION RESPIRATORY (INHALATION)
Qty: 360 ML | Refills: 1 | Status: SHIPPED | OUTPATIENT
Start: 2025-03-28

## 2025-04-02 DIAGNOSIS — Z71.6 ENCOUNTER FOR SMOKING CESSATION COUNSELING: ICD-10-CM

## 2025-04-02 DIAGNOSIS — F90.9 ATTENTION DEFICIT HYPERACTIVITY DISORDER (ADHD), UNSPECIFIED ADHD TYPE: ICD-10-CM

## 2025-04-02 DIAGNOSIS — F41.9 ANXIETY: ICD-10-CM

## 2025-04-02 RX ORDER — IPRATROPIUM BROMIDE AND ALBUTEROL SULFATE 2.5; .5 MG/3ML; MG/3ML
SOLUTION RESPIRATORY (INHALATION)
Qty: 360 ML | Refills: 1 | OUTPATIENT
Start: 2025-04-02

## 2025-04-02 RX ORDER — BUPROPION HYDROCHLORIDE 150 MG/1
150 TABLET ORAL DAILY
Qty: 30 TABLET | Refills: 1 | Status: SHIPPED | OUTPATIENT
Start: 2025-04-02

## 2025-04-02 RX ORDER — LISDEXAMFETAMINE DIMESYLATE 30 MG/1
30 CAPSULE ORAL EVERY MORNING
Qty: 30 CAPSULE | Refills: 0 | Status: SHIPPED | OUTPATIENT
Start: 2025-04-02

## 2025-04-11 NOTE — TELEPHONE ENCOUNTER
Attempted to reach Kenia, voicemail not set up regarding referral to dermatology. Inquiring if has been scheduled or has been seen     Relay

## 2025-04-14 ENCOUNTER — TELEPHONE (OUTPATIENT)
Dept: FAMILY MEDICINE CLINIC | Facility: CLINIC | Age: 45
End: 2025-04-14
Payer: COMMERCIAL

## 2025-04-14 DIAGNOSIS — J45.50 SEVERE PERSISTENT ASTHMA WITHOUT COMPLICATION: Primary | ICD-10-CM

## 2025-04-14 NOTE — TELEPHONE ENCOUNTER
Spoke with Kenia  regarding xray hip and mammogram order.  Has not been able to complete due to co-worker out of office. Co-worker is set to return in two weeks and will schedule at that time.    Relay     Moderate Variability

## 2025-04-15 RX ORDER — IPRATROPIUM BROMIDE AND ALBUTEROL SULFATE 2.5; .5 MG/3ML; MG/3ML
3 SOLUTION RESPIRATORY (INHALATION)
Qty: 1080 ML | Refills: 1 | Status: SHIPPED | OUTPATIENT
Start: 2025-04-15

## 2025-05-10 DIAGNOSIS — Z71.6 ENCOUNTER FOR SMOKING CESSATION COUNSELING: ICD-10-CM

## 2025-05-10 DIAGNOSIS — F90.9 ATTENTION DEFICIT HYPERACTIVITY DISORDER (ADHD), UNSPECIFIED ADHD TYPE: ICD-10-CM

## 2025-05-10 DIAGNOSIS — F41.9 ANXIETY: ICD-10-CM

## 2025-05-10 DIAGNOSIS — I10 PRIMARY HYPERTENSION: ICD-10-CM

## 2025-05-12 RX ORDER — BUPROPION HYDROCHLORIDE 150 MG/1
150 TABLET ORAL DAILY
Qty: 30 TABLET | Refills: 1 | Status: SHIPPED | OUTPATIENT
Start: 2025-05-12

## 2025-05-12 RX ORDER — LISDEXAMFETAMINE DIMESYLATE 30 MG/1
30 CAPSULE ORAL EVERY MORNING
Qty: 30 CAPSULE | Refills: 0 | Status: SHIPPED | OUTPATIENT
Start: 2025-05-12

## 2025-05-12 RX ORDER — ALBUTEROL SULFATE AND BUDESONIDE 90; 80 UG/1; UG/1
2 AEROSOL, METERED RESPIRATORY (INHALATION) EVERY 4 HOURS PRN
Qty: 8 G | Refills: 2 | Status: SHIPPED | OUTPATIENT
Start: 2025-05-12

## 2025-05-12 RX ORDER — LISINOPRIL 20 MG/1
20 TABLET ORAL DAILY
Qty: 90 TABLET | Refills: 2 | OUTPATIENT
Start: 2025-05-12

## 2025-06-16 DIAGNOSIS — Z71.6 ENCOUNTER FOR SMOKING CESSATION COUNSELING: ICD-10-CM

## 2025-06-16 DIAGNOSIS — I10 PRIMARY HYPERTENSION: ICD-10-CM

## 2025-06-16 DIAGNOSIS — F90.9 ATTENTION DEFICIT HYPERACTIVITY DISORDER (ADHD), UNSPECIFIED ADHD TYPE: ICD-10-CM

## 2025-06-16 DIAGNOSIS — F41.9 ANXIETY: ICD-10-CM

## 2025-06-16 DIAGNOSIS — J45.50 SEVERE PERSISTENT ASTHMA WITHOUT COMPLICATION: ICD-10-CM

## 2025-06-16 RX ORDER — IPRATROPIUM BROMIDE AND ALBUTEROL SULFATE 2.5; .5 MG/3ML; MG/3ML
3 SOLUTION RESPIRATORY (INHALATION)
Qty: 1080 ML | Refills: 1 | Status: SHIPPED | OUTPATIENT
Start: 2025-06-16

## 2025-06-16 RX ORDER — LISINOPRIL 20 MG/1
20 TABLET ORAL DAILY
Qty: 90 TABLET | Refills: 2 | Status: SHIPPED | OUTPATIENT
Start: 2025-06-16

## 2025-06-16 RX ORDER — BUPROPION HYDROCHLORIDE 150 MG/1
150 TABLET ORAL DAILY
Qty: 30 TABLET | Refills: 1 | Status: SHIPPED | OUTPATIENT
Start: 2025-06-16

## 2025-06-16 RX ORDER — LISDEXAMFETAMINE DIMESYLATE 30 MG/1
30 CAPSULE ORAL EVERY MORNING
Qty: 30 CAPSULE | Refills: 0 | Status: SHIPPED | OUTPATIENT
Start: 2025-06-16

## 2025-08-07 DIAGNOSIS — Z71.6 ENCOUNTER FOR SMOKING CESSATION COUNSELING: ICD-10-CM

## 2025-08-07 DIAGNOSIS — F90.9 ATTENTION DEFICIT HYPERACTIVITY DISORDER (ADHD), UNSPECIFIED ADHD TYPE: ICD-10-CM

## 2025-08-07 DIAGNOSIS — F41.9 ANXIETY: ICD-10-CM

## 2025-08-07 DIAGNOSIS — I10 PRIMARY HYPERTENSION: ICD-10-CM

## 2025-08-07 DIAGNOSIS — J45.50 SEVERE PERSISTENT ASTHMA WITHOUT COMPLICATION: ICD-10-CM

## 2025-08-07 RX ORDER — VARENICLINE TARTRATE 1 MG/1
1 TABLET, FILM COATED ORAL 2 TIMES DAILY
Qty: 56 TABLET | Refills: 1 | Status: SHIPPED | OUTPATIENT
Start: 2025-09-04 | End: 2025-10-30

## 2025-08-07 RX ORDER — LISINOPRIL 20 MG/1
20 TABLET ORAL DAILY
Qty: 90 TABLET | Refills: 2 | OUTPATIENT
Start: 2025-08-07

## 2025-08-07 RX ORDER — LISDEXAMFETAMINE DIMESYLATE 30 MG/1
30 CAPSULE ORAL EVERY MORNING
Qty: 30 CAPSULE | Refills: 0 | Status: SHIPPED | OUTPATIENT
Start: 2025-08-07

## 2025-08-07 RX ORDER — VARENICLINE TARTRATE 0.5 (11)-1
KIT ORAL
Qty: 1 EACH | Refills: 0 | Status: SHIPPED | OUTPATIENT
Start: 2025-08-07 | End: 2025-09-04

## 2025-08-07 RX ORDER — BUPROPION HYDROCHLORIDE 150 MG/1
150 TABLET ORAL DAILY
Qty: 30 TABLET | Refills: 1 | Status: SHIPPED | OUTPATIENT
Start: 2025-08-07

## 2025-08-07 RX ORDER — IPRATROPIUM BROMIDE AND ALBUTEROL SULFATE 2.5; .5 MG/3ML; MG/3ML
3 SOLUTION RESPIRATORY (INHALATION)
Qty: 1080 ML | Refills: 1 | OUTPATIENT
Start: 2025-08-07

## 2025-08-07 RX ORDER — NICOTINE 21 MG/24HR
1 PATCH, TRANSDERMAL 24 HOURS TRANSDERMAL EVERY 24 HOURS
Qty: 30 PATCH | Refills: 1 | Status: SHIPPED | OUTPATIENT
Start: 2025-08-07 | End: 2025-09-06